# Patient Record
Sex: FEMALE | Race: OTHER | HISPANIC OR LATINO
[De-identification: names, ages, dates, MRNs, and addresses within clinical notes are randomized per-mention and may not be internally consistent; named-entity substitution may affect disease eponyms.]

---

## 2018-02-28 ENCOUNTER — ASOB RESULT (OUTPATIENT)
Age: 23
End: 2018-02-28

## 2018-02-28 ENCOUNTER — APPOINTMENT (OUTPATIENT)
Dept: ANTEPARTUM | Facility: CLINIC | Age: 23
End: 2018-02-28
Payer: MEDICAID

## 2018-02-28 PROBLEM — Z00.00 ENCOUNTER FOR PREVENTIVE HEALTH EXAMINATION: Status: ACTIVE | Noted: 2018-02-28

## 2018-02-28 PROCEDURE — 76816 OB US FOLLOW-UP PER FETUS: CPT

## 2018-03-28 ENCOUNTER — APPOINTMENT (OUTPATIENT)
Dept: ANTEPARTUM | Facility: CLINIC | Age: 23
End: 2018-03-28

## 2018-04-04 ENCOUNTER — INPATIENT (INPATIENT)
Facility: HOSPITAL | Age: 23
LOS: 1 days | Discharge: HOME | DRG: 779 | End: 2018-04-06
Attending: OBSTETRICS & GYNECOLOGY | Admitting: OBSTETRICS & GYNECOLOGY
Payer: MEDICAID

## 2018-04-04 ENCOUNTER — APPOINTMENT (OUTPATIENT)
Dept: ANTEPARTUM | Facility: CLINIC | Age: 23
End: 2018-04-04
Payer: COMMERCIAL

## 2018-04-04 ENCOUNTER — ASOB RESULT (OUTPATIENT)
Age: 23
End: 2018-04-04

## 2018-04-04 VITALS — HEIGHT: 67 IN | WEIGHT: 141.1 LBS

## 2018-04-04 DIAGNOSIS — O26.893 OTHER SPECIFIED PREGNANCY RELATED CONDITIONS, THIRD TRIMESTER: ICD-10-CM

## 2018-04-04 LAB
ABO RH CONFIRMATION: SIGNIFICANT CHANGE UP
ALBUMIN SERPL ELPH-MCNC: 3.5 G/DL — SIGNIFICANT CHANGE UP (ref 3.3–5.2)
ALP SERPL-CCNC: 59 U/L — SIGNIFICANT CHANGE UP (ref 40–120)
ALT FLD-CCNC: 13 U/L — SIGNIFICANT CHANGE UP
AMPHET UR-MCNC: NEGATIVE — SIGNIFICANT CHANGE UP
ANION GAP SERPL CALC-SCNC: 13 MMOL/L — SIGNIFICANT CHANGE UP (ref 5–17)
APPEARANCE UR: CLEAR — SIGNIFICANT CHANGE UP
APTT BLD: 30.2 SEC — SIGNIFICANT CHANGE UP (ref 27.5–37.4)
AST SERPL-CCNC: 20 U/L — SIGNIFICANT CHANGE UP
BARBITURATES UR SCN-MCNC: NEGATIVE — SIGNIFICANT CHANGE UP
BASOPHILS # BLD AUTO: 0 K/UL — SIGNIFICANT CHANGE UP (ref 0–0.2)
BASOPHILS NFR BLD AUTO: 0.5 % — SIGNIFICANT CHANGE UP (ref 0–2)
BENZODIAZ UR-MCNC: NEGATIVE — SIGNIFICANT CHANGE UP
BILIRUB SERPL-MCNC: 0.2 MG/DL — LOW (ref 0.4–2)
BILIRUB UR-MCNC: NEGATIVE — SIGNIFICANT CHANGE UP
BLD GP AB SCN SERPL QL: SIGNIFICANT CHANGE UP
BUN SERPL-MCNC: 9 MG/DL — SIGNIFICANT CHANGE UP (ref 8–20)
CALCIUM SERPL-MCNC: 8.8 MG/DL — SIGNIFICANT CHANGE UP (ref 8.6–10.2)
CHLORIDE SERPL-SCNC: 106 MMOL/L — SIGNIFICANT CHANGE UP (ref 98–107)
CO2 SERPL-SCNC: 21 MMOL/L — LOW (ref 22–29)
COCAINE METAB.OTHER UR-MCNC: NEGATIVE — SIGNIFICANT CHANGE UP
COLOR SPEC: YELLOW — SIGNIFICANT CHANGE UP
CREAT SERPL-MCNC: 0.58 MG/DL — SIGNIFICANT CHANGE UP (ref 0.5–1.3)
DIFF PNL FLD: NEGATIVE — SIGNIFICANT CHANGE UP
EOSINOPHIL # BLD AUTO: 0.2 K/UL — SIGNIFICANT CHANGE UP (ref 0–0.5)
EOSINOPHIL NFR BLD AUTO: 4.1 % — SIGNIFICANT CHANGE UP (ref 0–6)
GLUCOSE SERPL-MCNC: 83 MG/DL — SIGNIFICANT CHANGE UP (ref 70–115)
GLUCOSE UR QL: NEGATIVE MG/DL — SIGNIFICANT CHANGE UP
HCT VFR BLD CALC: 38.3 % — SIGNIFICANT CHANGE UP (ref 37–47)
HGB BLD-MCNC: 12.6 G/DL — SIGNIFICANT CHANGE UP (ref 12–16)
INR BLD: 0.88 RATIO — SIGNIFICANT CHANGE UP (ref 0.88–1.16)
KETONES UR-MCNC: NEGATIVE — SIGNIFICANT CHANGE UP
LEUKOCYTE ESTERASE UR-ACNC: NEGATIVE — SIGNIFICANT CHANGE UP
LYMPHOCYTES # BLD AUTO: 1.6 K/UL — SIGNIFICANT CHANGE UP (ref 1–4.8)
LYMPHOCYTES # BLD AUTO: 26.9 % — SIGNIFICANT CHANGE UP (ref 20–55)
MCHC RBC-ENTMCNC: 30.4 PG — SIGNIFICANT CHANGE UP (ref 27–31)
MCHC RBC-ENTMCNC: 32.9 G/DL — SIGNIFICANT CHANGE UP (ref 32–36)
MCV RBC AUTO: 92.3 FL — SIGNIFICANT CHANGE UP (ref 81–99)
METHADONE UR-MCNC: NEGATIVE — SIGNIFICANT CHANGE UP
MONOCYTES # BLD AUTO: 0.6 K/UL — SIGNIFICANT CHANGE UP (ref 0–0.8)
MONOCYTES NFR BLD AUTO: 9.3 % — SIGNIFICANT CHANGE UP (ref 3–10)
NEUTROPHILS # BLD AUTO: 3.5 K/UL — SIGNIFICANT CHANGE UP (ref 1.8–8)
NEUTROPHILS NFR BLD AUTO: 58.9 % — SIGNIFICANT CHANGE UP (ref 37–73)
NITRITE UR-MCNC: NEGATIVE — SIGNIFICANT CHANGE UP
OPIATES UR-MCNC: NEGATIVE — SIGNIFICANT CHANGE UP
PCP SPEC-MCNC: SIGNIFICANT CHANGE UP
PCP UR-MCNC: NEGATIVE — SIGNIFICANT CHANGE UP
PH UR: 8 — SIGNIFICANT CHANGE UP (ref 5–8)
PLATELET # BLD AUTO: 271 K/UL — SIGNIFICANT CHANGE UP (ref 150–400)
POTASSIUM SERPL-MCNC: 3.9 MMOL/L — SIGNIFICANT CHANGE UP (ref 3.5–5.3)
POTASSIUM SERPL-SCNC: 3.9 MMOL/L — SIGNIFICANT CHANGE UP (ref 3.5–5.3)
PROT SERPL-MCNC: 6.5 G/DL — LOW (ref 6.6–8.7)
PROT UR-MCNC: NEGATIVE MG/DL — SIGNIFICANT CHANGE UP
PROTHROM AB SERPL-ACNC: 9.7 SEC — LOW (ref 9.8–12.7)
RBC # BLD: 4.15 M/UL — LOW (ref 4.4–5.2)
RBC # FLD: 13.2 % — SIGNIFICANT CHANGE UP (ref 11–15.6)
SODIUM SERPL-SCNC: 140 MMOL/L — SIGNIFICANT CHANGE UP (ref 135–145)
SP GR SPEC: 1.01 — SIGNIFICANT CHANGE UP (ref 1.01–1.02)
THC UR QL: NEGATIVE — SIGNIFICANT CHANGE UP
TSH SERPL-MCNC: 1.27 UIU/ML — SIGNIFICANT CHANGE UP (ref 0.27–4.2)
TYPE + AB SCN PNL BLD: SIGNIFICANT CHANGE UP
UROBILINOGEN FLD QL: NEGATIVE MG/DL — SIGNIFICANT CHANGE UP
WBC # BLD: 5.9 K/UL — SIGNIFICANT CHANGE UP (ref 4.8–10.8)
WBC # FLD AUTO: 5.9 K/UL — SIGNIFICANT CHANGE UP (ref 4.8–10.8)

## 2018-04-04 PROCEDURE — 76817 TRANSVAGINAL US OBSTETRIC: CPT

## 2018-04-04 PROCEDURE — 76811 OB US DETAILED SNGL FETUS: CPT

## 2018-04-04 RX ORDER — SODIUM CHLORIDE 9 MG/ML
1000 INJECTION, SOLUTION INTRAVENOUS
Qty: 0 | Refills: 0 | Status: DISCONTINUED | OUTPATIENT
Start: 2018-04-04 | End: 2018-04-06

## 2018-04-04 RX ORDER — CITRIC ACID/SODIUM CITRATE 300-500 MG
30 SOLUTION, ORAL ORAL ONCE
Qty: 0 | Refills: 0 | Status: DISCONTINUED | OUTPATIENT
Start: 2018-04-04 | End: 2018-04-06

## 2018-04-04 RX ORDER — OXYTOCIN 10 UNIT/ML
333 VIAL (ML) INJECTION
Qty: 20 | Refills: 0 | Status: COMPLETED | OUTPATIENT
Start: 2018-04-04

## 2018-04-04 RX ORDER — CITRIC ACID/SODIUM CITRATE 300-500 MG
15 SOLUTION, ORAL ORAL EVERY 4 HOURS
Qty: 0 | Refills: 0 | Status: DISCONTINUED | OUTPATIENT
Start: 2018-04-04 | End: 2018-04-04

## 2018-04-04 RX ORDER — SODIUM CHLORIDE 9 MG/ML
1000 INJECTION, SOLUTION INTRAVENOUS ONCE
Qty: 0 | Refills: 0 | Status: DISCONTINUED | OUTPATIENT
Start: 2018-04-04 | End: 2018-04-06

## 2018-04-04 RX ADMIN — SODIUM CHLORIDE 125 MILLILITER(S): 9 INJECTION, SOLUTION INTRAVENOUS at 18:15

## 2018-04-05 ENCOUNTER — RESULT REVIEW (OUTPATIENT)
Age: 23
End: 2018-04-05

## 2018-04-05 LAB
KLEIHAUER-BETKE CALCULATION: 0 % — SIGNIFICANT CHANGE UP (ref 0–0.3)
T PALLIDUM AB TITR SER: NEGATIVE — SIGNIFICANT CHANGE UP

## 2018-04-05 PROCEDURE — 88307 TISSUE EXAM BY PATHOLOGIST: CPT | Mod: 26

## 2018-04-05 PROCEDURE — 88313 SPECIAL STAINS GROUP 2: CPT | Mod: 26

## 2018-04-05 RX ORDER — BUTORPHANOL TARTRATE 2 MG/ML
1 INJECTION, SOLUTION INTRAMUSCULAR; INTRAVENOUS ONCE
Qty: 0 | Refills: 0 | Status: DISCONTINUED | OUTPATIENT
Start: 2018-04-05 | End: 2018-04-05

## 2018-04-05 RX ORDER — OXYTOCIN 10 UNIT/ML
4 VIAL (ML) INJECTION
Qty: 30 | Refills: 0 | Status: DISCONTINUED | OUTPATIENT
Start: 2018-04-05 | End: 2018-04-06

## 2018-04-05 RX ORDER — AMPICILLIN TRIHYDRATE 250 MG
2 CAPSULE ORAL EVERY 6 HOURS
Qty: 0 | Refills: 0 | Status: COMPLETED | OUTPATIENT
Start: 2018-04-06 | End: 2018-04-06

## 2018-04-05 RX ORDER — BUTORPHANOL TARTRATE 2 MG/ML
2 INJECTION, SOLUTION INTRAMUSCULAR; INTRAVENOUS ONCE
Qty: 0 | Refills: 0 | Status: DISCONTINUED | OUTPATIENT
Start: 2018-04-05 | End: 2018-04-05

## 2018-04-05 RX ORDER — GENTAMICIN SULFATE 40 MG/ML
VIAL (ML) INJECTION
Qty: 0 | Refills: 0 | Status: DISCONTINUED | OUTPATIENT
Start: 2018-04-05 | End: 2018-04-06

## 2018-04-05 RX ORDER — GENTAMICIN SULFATE 40 MG/ML
100 VIAL (ML) INJECTION ONCE
Qty: 0 | Refills: 0 | Status: COMPLETED | OUTPATIENT
Start: 2018-04-05 | End: 2018-04-05

## 2018-04-05 RX ORDER — OXYTOCIN 10 UNIT/ML
333 VIAL (ML) INJECTION
Qty: 20 | Refills: 0 | Status: DISCONTINUED | OUTPATIENT
Start: 2018-04-05 | End: 2018-04-06

## 2018-04-05 RX ORDER — BUTORPHANOL TARTRATE 2 MG/ML
2 INJECTION, SOLUTION INTRAMUSCULAR; INTRAVENOUS ONCE
Qty: 0 | Refills: 0 | Status: DISCONTINUED | OUTPATIENT
Start: 2018-04-05 | End: 2018-04-06

## 2018-04-05 RX ORDER — AMPICILLIN TRIHYDRATE 250 MG
2 CAPSULE ORAL ONCE
Qty: 0 | Refills: 0 | Status: COMPLETED | OUTPATIENT
Start: 2018-04-05 | End: 2018-04-05

## 2018-04-05 RX ORDER — GENTAMICIN SULFATE 40 MG/ML
60 VIAL (ML) INJECTION EVERY 8 HOURS
Qty: 0 | Refills: 0 | Status: DISCONTINUED | OUTPATIENT
Start: 2018-04-06 | End: 2018-04-06

## 2018-04-05 RX ORDER — AMPICILLIN TRIHYDRATE 250 MG
2 CAPSULE ORAL ONCE
Qty: 0 | Refills: 0 | Status: DISCONTINUED | OUTPATIENT
Start: 2018-04-05 | End: 2018-04-05

## 2018-04-05 RX ORDER — AMPICILLIN TRIHYDRATE 250 MG
CAPSULE ORAL
Qty: 0 | Refills: 0 | Status: COMPLETED | OUTPATIENT
Start: 2018-04-06 | End: 2018-04-06

## 2018-04-05 RX ORDER — AMPICILLIN TRIHYDRATE 250 MG
CAPSULE ORAL
Qty: 0 | Refills: 0 | Status: DISCONTINUED | OUTPATIENT
Start: 2018-04-05 | End: 2018-04-05

## 2018-04-05 RX ORDER — ONDANSETRON 8 MG/1
4 TABLET, FILM COATED ORAL EVERY 4 HOURS
Qty: 0 | Refills: 0 | Status: DISCONTINUED | OUTPATIENT
Start: 2018-04-05 | End: 2018-04-06

## 2018-04-05 RX ORDER — ONDANSETRON 8 MG/1
4 TABLET, FILM COATED ORAL EVERY 4 HOURS
Qty: 0 | Refills: 0 | Status: DISCONTINUED | OUTPATIENT
Start: 2018-04-05 | End: 2018-04-05

## 2018-04-05 RX ADMIN — BUTORPHANOL TARTRATE 2 MILLIGRAM(S): 2 INJECTION, SOLUTION INTRAMUSCULAR; INTRAVENOUS at 04:53

## 2018-04-05 RX ADMIN — BUTORPHANOL TARTRATE 1 MILLIGRAM(S): 2 INJECTION, SOLUTION INTRAMUSCULAR; INTRAVENOUS at 13:32

## 2018-04-05 RX ADMIN — ONDANSETRON 4 MILLIGRAM(S): 8 TABLET, FILM COATED ORAL at 08:28

## 2018-04-05 RX ADMIN — Medication 4 MILLIUNIT(S)/MIN: at 18:12

## 2018-04-05 RX ADMIN — BUTORPHANOL TARTRATE 1 MILLIGRAM(S): 2 INJECTION, SOLUTION INTRAMUSCULAR; INTRAVENOUS at 13:47

## 2018-04-05 RX ADMIN — BUTORPHANOL TARTRATE 2 MILLIGRAM(S): 2 INJECTION, SOLUTION INTRAMUSCULAR; INTRAVENOUS at 00:45

## 2018-04-05 RX ADMIN — Medication 216 GRAM(S): at 20:08

## 2018-04-05 RX ADMIN — BUTORPHANOL TARTRATE 2 MILLIGRAM(S): 2 INJECTION, SOLUTION INTRAMUSCULAR; INTRAVENOUS at 01:00

## 2018-04-05 RX ADMIN — Medication 999 MILLIUNIT(S)/MIN: at 18:29

## 2018-04-05 RX ADMIN — BUTORPHANOL TARTRATE 2 MILLIGRAM(S): 2 INJECTION, SOLUTION INTRAMUSCULAR; INTRAVENOUS at 05:08

## 2018-04-05 RX ADMIN — Medication 200 MILLIGRAM(S): at 20:48

## 2018-04-05 RX ADMIN — BUTORPHANOL TARTRATE 1 MILLIGRAM(S): 2 INJECTION, SOLUTION INTRAMUSCULAR; INTRAVENOUS at 09:17

## 2018-04-05 RX ADMIN — BUTORPHANOL TARTRATE 1 MILLIGRAM(S): 2 INJECTION, SOLUTION INTRAMUSCULAR; INTRAVENOUS at 09:02

## 2018-04-06 ENCOUNTER — TRANSCRIPTION ENCOUNTER (OUTPATIENT)
Age: 23
End: 2018-04-06

## 2018-04-06 VITALS
OXYGEN SATURATION: 98 % | SYSTOLIC BLOOD PRESSURE: 95 MMHG | HEART RATE: 72 BPM | DIASTOLIC BLOOD PRESSURE: 53 MMHG | TEMPERATURE: 98 F | RESPIRATION RATE: 18 BRPM

## 2018-04-06 DIAGNOSIS — O36.4XX0 MATERNAL CARE FOR INTRAUTERINE DEATH, NOT APPLICABLE OR UNSPECIFIED: ICD-10-CM

## 2018-04-06 LAB
GRAM STN FLD: SIGNIFICANT CHANGE UP
GRAM STN FLD: SIGNIFICANT CHANGE UP
SPECIMEN SOURCE: SIGNIFICANT CHANGE UP
SPECIMEN SOURCE: SIGNIFICANT CHANGE UP

## 2018-04-06 RX ORDER — IBUPROFEN 200 MG
1 TABLET ORAL
Qty: 56 | Refills: 0
Start: 2018-04-06 | End: 2018-04-19

## 2018-04-06 RX ADMIN — Medication 216 GRAM(S): at 03:05

## 2018-04-06 RX ADMIN — Medication 200 MILLIGRAM(S): at 05:22

## 2018-04-06 RX ADMIN — Medication 216 GRAM(S): at 10:13

## 2018-04-06 RX ADMIN — Medication 200 MILLIGRAM(S): at 15:05

## 2018-04-06 NOTE — DISCHARGE NOTE ADULT - PROVIDER TOKENS
FREE:[LAST:[HR],PHONE:[(   )    -],FAX:[(   )    -],ADDRESS:[[facility], [place] [address]  Ph: [phone]]]

## 2018-04-06 NOTE — DISCHARGE NOTE ADULT - CARE PLAN
Principal Discharge DX:	Fetal demise > 22 weeks, delivered, current hospitalization  Goal:	Recovery  Assessment and plan of treatment:	Please take ibuprofen as needed for pain. Follow up with PMD at [facility] clinic in 4-6 weeks for check up.

## 2018-04-06 NOTE — PROGRESS NOTE ADULT - ATTENDING COMMENTS
s/p induced delivery of fetal demise at 23 wks, ebl 100cc  no complaints  denies pain or chills  exam wnl    had low grade temp prior to delivery without foul smelling fluid, uterine tenderness or tachycardia  likely pyrexia due to large doses of prostaglandins  received prophylactic antibiotics for 24 hrs    declined pastoral care or social work  signed consents for autopsy and cytogenetic analysis  discharge home today  follow up in 1 wk in [place]  precautions understood

## 2018-04-06 NOTE — DISCHARGE NOTE ADULT - OTHER SIGNIFICANT FINDINGS
12.6   5.9   )-----------( 271      ( 04 Apr 2018 18:39 )             38.3       Culture - Surgical Swab (collected 06 Apr 2018 08:30)  Source: Placenta placenta maternal side  Gram Stain (06 Apr 2018 08:44):    No gram stain performed, only one swab received for culture.    Culture - Surgical Swab (collected 06 Apr 2018 08:28)  Source: Placenta placenta fetal side  Gram Stain (06 Apr 2018 08:44):    No gram stain performed, only one swab received for culture.

## 2018-04-06 NOTE — DISCHARGE NOTE ADULT - PLAN OF CARE
Recovery Please take ibuprofen as needed for pain. Follow up with PMD at [facility] clinic in 4-6 weeks for check up.

## 2018-04-06 NOTE — DISCHARGE NOTE ADULT - MEDICATION SUMMARY - MEDICATIONS TO TAKE
I will START or STAY ON the medications listed below when I get home from the hospital:    ibuprofen 600 mg oral tablet  -- 1 tab(s) by mouth every 6 hours as needed for pain  -- Do not take this drug if you are pregnant.  It is very important that you take or use this exactly as directed.  Do not skip doses or discontinue unless directed by your doctor.  May cause drowsiness or dizziness.  Obtain medical advice before taking any non-prescription drugs as some may affect the action of this medication.  Take with food or milk.    -- Indication: For pain

## 2018-04-06 NOTE — DISCHARGE NOTE ADULT - HOSPITAL COURSE
Pt is 23y year old   s/p fetal demise at 23 wks gestation. Fetus and placenta sent to lab for further analysis. Pt received antibiotics and was afebrile 24 hour. Her recovery course was uncomplicated. Pt advised to take pain meds as needed. Pt is now medically optimized for discharge to follow up with PMD in 4-6 weeks.

## 2018-04-06 NOTE — DISCHARGE NOTE ADULT - CARE PROVIDER_API CALL
[facility],   [address], [place] [address]  Ph: [phone]  Phone: (   )    -  Fax: (   )    -

## 2018-04-06 NOTE — PROGRESS NOTE ADULT - PROBLEM SELECTOR PLAN 1
Patient delivered  Patient currently on antibiotics- will d/c antibiotics if patient remains afebrile for 24 hours.   If patient remains a febrile patient can be d/c later this afternoon  - Fetus and placenta have been sent for Cytogenetic testing as well autopsy.

## 2018-04-06 NOTE — DISCHARGE NOTE ADULT - PATIENT PORTAL LINK FT
You can access the [facility] Patient Portal, offered by [facility], by registering with the following website: [email or web address]

## 2018-04-06 NOTE — PROGRESS NOTE ADULT - SUBJECTIVE AND OBJECTIVE BOX
23y year old   s/p fetal demise at 23 wks gestation PPD#1.   Patient seen and examined at bedside, no acute overnight events.   Patient is ambulating, +eating, +PO hydration, +voiding, +Flatus, -BM, +Formula feeding, +Breast feeding and pain is well controlled.  Denies headache, SOB, fever, chills and calf pain.    VS:   Vital Signs Last 24 Hrs  T(C): 36.8 (2018 05:43), Max: 37.1 (2018 01:35)  T(F): 98.2 (2018 05:43), Max: 98.8 (2018 01:35)  HR: 69 (2018 05:43) (68 - 79)  BP: 95/52 (2018 05:43) (95/52 - 126/63)  BP(mean): --  RR: 16 (2018 05:43) (16 - 18)  SpO2: 98% (2018 05:43) (97% - 99%)    Physical Exam:  General: NAD  CVS: RRR, +S1/S2  Lungs: CTAB, no wheeze, ronchi or rales.   Breast: No tenderness or abnormal discharge.  Abdomen: +BS, soft, ND, minimally tender, Fundus firm at level of umbilicus.   Pelvic: Minimal lochia  Ext: No cyanosis, edema or calf tenderness.     Labs:                        12.6   5.9   )-----------( 271      ( 2018 18:39 )             38.3     Urinalysis Basic - ( 2018 18:37 )    Color: Yellow / Appearance: Clear / S.010 / pH: x  Gluc: x / Ketone: Negative  / Bili: Negative / Urobili: Negative mg/dL   Blood: x / Protein: Negative mg/dL / Nitrite: Negative   Leuk Esterase: Negative / RBC: x / WBC x   Sq Epi: x / Non Sq Epi: x / Bacteria: x        Medication:  MEDICATIONS  (STANDING):  ampicillin  IVPB      ampicillin  IVPB 2 Gram(s) IV Intermittent every 6 hours  butorphanol Injectable 2 milliGRAM(s) IV Push once  citric acid/sodium citrate Solution 30 milliLiter(s) Oral once  gentamicin   IVPB 60 milliGRAM(s) IV Intermittent every 8 hours  gentamicin   IVPB      lactated ringers Bolus 1000 milliLiter(s) IV Bolus once  oxytocin Infusion 4 milliUNIT(s)/Min (4 mL/Hr) IV Continuous <Continuous>  oxytocin Infusion 333 milliUNIT(s)/Min (999 mL/Hr) IV Continuous <Continuous>    MEDICATIONS  (PRN):  ondansetron Injectable 4 milliGRAM(s) IV Push every 4 hours PRN Nausea and/or Vomiting

## 2018-04-08 LAB
-  AMIKACIN: SIGNIFICANT CHANGE UP
-  AMPICILLIN/SULBACTAM: SIGNIFICANT CHANGE UP
-  AMPICILLIN: SIGNIFICANT CHANGE UP
-  AZTREONAM: SIGNIFICANT CHANGE UP
-  CEFAZOLIN: SIGNIFICANT CHANGE UP
-  CEFEPIME: SIGNIFICANT CHANGE UP
-  CEFOXITIN: SIGNIFICANT CHANGE UP
-  CEFTRIAXONE: SIGNIFICANT CHANGE UP
-  CIPROFLOXACIN: SIGNIFICANT CHANGE UP
-  CLINDAMYCIN: SIGNIFICANT CHANGE UP
-  ERTAPENEM: SIGNIFICANT CHANGE UP
-  ERYTHROMYCIN: SIGNIFICANT CHANGE UP
-  GENTAMICIN: SIGNIFICANT CHANGE UP
-  IMIPENEM: SIGNIFICANT CHANGE UP
-  LEVOFLOXACIN: SIGNIFICANT CHANGE UP
-  MEROPENEM: SIGNIFICANT CHANGE UP
-  MOXIFLOXACIN(AEROBIC): SIGNIFICANT CHANGE UP
-  MOXIFLOXACIN(AEROBIC): SIGNIFICANT CHANGE UP
-  OXACILLIN: SIGNIFICANT CHANGE UP
-  OXACILLIN: SIGNIFICANT CHANGE UP
-  PENICILLIN: SIGNIFICANT CHANGE UP
-  PIPERACILLIN/TAZOBACTAM: SIGNIFICANT CHANGE UP
-  RIFAMPIN: SIGNIFICANT CHANGE UP
-  RIFAMPIN: SIGNIFICANT CHANGE UP
-  TETRACYCLINE: SIGNIFICANT CHANGE UP
-  TETRACYCLINE: SIGNIFICANT CHANGE UP
-  TOBRAMYCIN: SIGNIFICANT CHANGE UP
-  TRIMETHOPRIM/SULFAMETHOXAZOLE: SIGNIFICANT CHANGE UP
-  VANCOMYCIN: SIGNIFICANT CHANGE UP
METHOD TYPE: SIGNIFICANT CHANGE UP

## 2018-04-11 LAB
CULTURE RESULTS: SIGNIFICANT CHANGE UP
CULTURE RESULTS: SIGNIFICANT CHANGE UP
ORGANISM # SPEC MICROSCOPIC CNT: SIGNIFICANT CHANGE UP
SPECIMEN SOURCE: SIGNIFICANT CHANGE UP
SPECIMEN SOURCE: SIGNIFICANT CHANGE UP

## 2018-05-23 ENCOUNTER — EMERGENCY (EMERGENCY)
Facility: HOSPITAL | Age: 23
LOS: 1 days | Discharge: HOME | End: 2018-05-23
Attending: EMERGENCY MEDICINE
Payer: COMMERCIAL

## 2018-05-23 VITALS
HEART RATE: 83 BPM | DIASTOLIC BLOOD PRESSURE: 65 MMHG | SYSTOLIC BLOOD PRESSURE: 117 MMHG | TEMPERATURE: 98 F | RESPIRATION RATE: 18 BRPM | OXYGEN SATURATION: 100 %

## 2018-05-23 VITALS — WEIGHT: 138.01 LBS | HEIGHT: 67 IN

## 2018-05-23 DIAGNOSIS — N93.9 ABNORMAL UTERINE AND VAGINAL BLEEDING, UNSPECIFIED: ICD-10-CM

## 2018-05-23 DIAGNOSIS — Z79.1 LONG TERM (CURRENT) USE OF NON-STEROIDAL ANTI-INFLAMMATORIES (NSAID): ICD-10-CM

## 2018-05-23 LAB
ALBUMIN SERPL ELPH-MCNC: 4.3 G/DL — SIGNIFICANT CHANGE UP (ref 3.3–5.2)
ALP SERPL-CCNC: 53 U/L — SIGNIFICANT CHANGE UP (ref 40–120)
ALT FLD-CCNC: 15 U/L — SIGNIFICANT CHANGE UP
ANION GAP SERPL CALC-SCNC: 13 MMOL/L — SIGNIFICANT CHANGE UP (ref 5–17)
APPEARANCE UR: CLEAR — SIGNIFICANT CHANGE UP
AST SERPL-CCNC: 21 U/L — SIGNIFICANT CHANGE UP
BASOPHILS # BLD AUTO: 0 K/UL — SIGNIFICANT CHANGE UP (ref 0–0.2)
BASOPHILS NFR BLD AUTO: 0.7 % — SIGNIFICANT CHANGE UP (ref 0–2)
BILIRUB SERPL-MCNC: 0.3 MG/DL — LOW (ref 0.4–2)
BILIRUB UR-MCNC: NEGATIVE — SIGNIFICANT CHANGE UP
BUN SERPL-MCNC: 10 MG/DL — SIGNIFICANT CHANGE UP (ref 8–20)
CALCIUM SERPL-MCNC: 9.2 MG/DL — SIGNIFICANT CHANGE UP (ref 8.6–10.2)
CHLORIDE SERPL-SCNC: 101 MMOL/L — SIGNIFICANT CHANGE UP (ref 98–107)
CO2 SERPL-SCNC: 25 MMOL/L — SIGNIFICANT CHANGE UP (ref 22–29)
COLOR SPEC: YELLOW — SIGNIFICANT CHANGE UP
CREAT SERPL-MCNC: 0.78 MG/DL — SIGNIFICANT CHANGE UP (ref 0.5–1.3)
DIFF PNL FLD: ABNORMAL
DIR ANTIGLOB POLYSPECIFIC INTERPRETATION: SIGNIFICANT CHANGE UP
EOSINOPHIL # BLD AUTO: 0.4 K/UL — SIGNIFICANT CHANGE UP (ref 0–0.5)
EOSINOPHIL NFR BLD AUTO: 6.3 % — HIGH (ref 0–6)
GLUCOSE SERPL-MCNC: 86 MG/DL — SIGNIFICANT CHANGE UP (ref 70–115)
GLUCOSE UR QL: NEGATIVE MG/DL — SIGNIFICANT CHANGE UP
HCG SERPL-ACNC: <5 MIU/ML — SIGNIFICANT CHANGE UP
HCG UR QL: NEGATIVE — SIGNIFICANT CHANGE UP
HCT VFR BLD CALC: 40.3 % — SIGNIFICANT CHANGE UP (ref 37–47)
HGB BLD-MCNC: 13.2 G/DL — SIGNIFICANT CHANGE UP (ref 12–16)
KETONES UR-MCNC: NEGATIVE — SIGNIFICANT CHANGE UP
LEUKOCYTE ESTERASE UR-ACNC: NEGATIVE — SIGNIFICANT CHANGE UP
LYMPHOCYTES # BLD AUTO: 2.4 K/UL — SIGNIFICANT CHANGE UP (ref 1–4.8)
LYMPHOCYTES # BLD AUTO: 41.3 % — SIGNIFICANT CHANGE UP (ref 20–55)
MCHC RBC-ENTMCNC: 29.3 PG — SIGNIFICANT CHANGE UP (ref 27–31)
MCHC RBC-ENTMCNC: 32.8 G/DL — SIGNIFICANT CHANGE UP (ref 32–36)
MCV RBC AUTO: 89.4 FL — SIGNIFICANT CHANGE UP (ref 81–99)
MONOCYTES # BLD AUTO: 0.6 K/UL — SIGNIFICANT CHANGE UP (ref 0–0.8)
MONOCYTES NFR BLD AUTO: 10.7 % — HIGH (ref 3–10)
NEUTROPHILS # BLD AUTO: 2.4 K/UL — SIGNIFICANT CHANGE UP (ref 1.8–8)
NEUTROPHILS NFR BLD AUTO: 41 % — SIGNIFICANT CHANGE UP (ref 37–73)
NITRITE UR-MCNC: NEGATIVE — SIGNIFICANT CHANGE UP
PH UR: 6 — SIGNIFICANT CHANGE UP (ref 5–8)
PLATELET # BLD AUTO: 304 K/UL — SIGNIFICANT CHANGE UP (ref 150–400)
POTASSIUM SERPL-MCNC: 3.6 MMOL/L — SIGNIFICANT CHANGE UP (ref 3.5–5.3)
POTASSIUM SERPL-SCNC: 3.6 MMOL/L — SIGNIFICANT CHANGE UP (ref 3.5–5.3)
PROT SERPL-MCNC: 7.6 G/DL — SIGNIFICANT CHANGE UP (ref 6.6–8.7)
PROT UR-MCNC: 15 MG/DL
RBC # BLD: 4.51 M/UL — SIGNIFICANT CHANGE UP (ref 4.4–5.2)
RBC # FLD: 11.5 % — SIGNIFICANT CHANGE UP (ref 11–15.6)
SODIUM SERPL-SCNC: 139 MMOL/L — SIGNIFICANT CHANGE UP (ref 135–145)
SP GR SPEC: 1.01 — SIGNIFICANT CHANGE UP (ref 1.01–1.02)
TYPE + AB SCN PNL BLD: SIGNIFICANT CHANGE UP
UROBILINOGEN FLD QL: NEGATIVE MG/DL — SIGNIFICANT CHANGE UP
WBC # BLD: 5.9 K/UL — SIGNIFICANT CHANGE UP (ref 4.8–10.8)
WBC # FLD AUTO: 5.9 K/UL — SIGNIFICANT CHANGE UP (ref 4.8–10.8)

## 2018-05-23 PROCEDURE — 85610 PROTHROMBIN TIME: CPT

## 2018-05-23 PROCEDURE — 87070 CULTURE OTHR SPECIMN AEROBIC: CPT

## 2018-05-23 PROCEDURE — 85730 THROMBOPLASTIN TIME PARTIAL: CPT

## 2018-05-23 PROCEDURE — 87075 CULTR BACTERIA EXCEPT BLOOD: CPT

## 2018-05-23 PROCEDURE — 85027 COMPLETE CBC AUTOMATED: CPT

## 2018-05-23 PROCEDURE — 99284 EMERGENCY DEPT VISIT MOD MDM: CPT

## 2018-05-23 PROCEDURE — 81003 URINALYSIS AUTO W/O SCOPE: CPT

## 2018-05-23 PROCEDURE — 86780 TREPONEMA PALLIDUM: CPT

## 2018-05-23 PROCEDURE — 76815 OB US LIMITED FETUS(S): CPT

## 2018-05-23 PROCEDURE — 85460 HEMOGLOBIN FETAL: CPT

## 2018-05-23 PROCEDURE — 88313 SPECIAL STAINS GROUP 2: CPT

## 2018-05-23 PROCEDURE — G0463: CPT

## 2018-05-23 PROCEDURE — 59025 FETAL NON-STRESS TEST: CPT

## 2018-05-23 PROCEDURE — 80307 DRUG TEST PRSMV CHEM ANLYZR: CPT

## 2018-05-23 PROCEDURE — 80053 COMPREHEN METABOLIC PANEL: CPT

## 2018-05-23 PROCEDURE — 84443 ASSAY THYROID STIM HORMONE: CPT

## 2018-05-23 PROCEDURE — 86850 RBC ANTIBODY SCREEN: CPT

## 2018-05-23 PROCEDURE — 86901 BLOOD TYPING SEROLOGIC RH(D): CPT

## 2018-05-23 PROCEDURE — T1013: CPT

## 2018-05-23 PROCEDURE — 88307 TISSUE EXAM BY PATHOLOGIST: CPT

## 2018-05-23 PROCEDURE — 36415 COLL VENOUS BLD VENIPUNCTURE: CPT

## 2018-05-23 PROCEDURE — 87186 SC STD MICRODIL/AGAR DIL: CPT

## 2018-05-23 PROCEDURE — 86900 BLOOD TYPING SEROLOGIC ABO: CPT

## 2018-05-23 PROCEDURE — 59050 FETAL MONITOR W/REPORT: CPT

## 2018-05-23 NOTE — ED ADULT NURSE NOTE - OBJECTIVE STATEMENT
pt presents to ED with vaginal bleeding since yesterday. pt stats she took a pregnancy 4 days ago and was positive. pt states she had a miscarriage 2 months ago. pt c/o abd pain yesterday. afebrile. a&ox3

## 2018-05-23 NOTE — ED PROVIDER NOTE - OBJECTIVE STATEMENT
This patient is a 23 year old woman  who presents to the ER c/o vaginal spotting.  She reports that she recently was pregnant and had fetal demise 2 months ago.  Her menses has not yet returned.  She began experiencing spotting yesterday.  She took a home pregnancy test and states that it was positive.  She denies abdominal pain at this time, dysuria, hematuria, and fever.

## 2018-05-24 LAB — ALLERGY+IMMUNOLOGY DIAG STUDY NOTE: SIGNIFICANT CHANGE UP

## 2018-05-24 PROCEDURE — 99283 EMERGENCY DEPT VISIT LOW MDM: CPT

## 2018-05-24 PROCEDURE — 85027 COMPLETE CBC AUTOMATED: CPT

## 2018-05-24 PROCEDURE — 86870 RBC ANTIBODY IDENTIFICATION: CPT

## 2018-05-24 PROCEDURE — 84702 CHORIONIC GONADOTROPIN TEST: CPT

## 2018-05-24 PROCEDURE — 86850 RBC ANTIBODY SCREEN: CPT

## 2018-05-24 PROCEDURE — 86901 BLOOD TYPING SEROLOGIC RH(D): CPT

## 2018-05-24 PROCEDURE — 86880 COOMBS TEST DIRECT: CPT

## 2018-05-24 PROCEDURE — 81025 URINE PREGNANCY TEST: CPT

## 2018-05-24 PROCEDURE — 81001 URINALYSIS AUTO W/SCOPE: CPT

## 2018-05-24 PROCEDURE — 36415 COLL VENOUS BLD VENIPUNCTURE: CPT

## 2018-05-24 PROCEDURE — T1013: CPT

## 2018-05-24 PROCEDURE — 80053 COMPREHEN METABOLIC PANEL: CPT

## 2018-05-24 PROCEDURE — 86900 BLOOD TYPING SEROLOGIC ABO: CPT

## 2019-09-23 ENCOUNTER — APPOINTMENT (OUTPATIENT)
Dept: ANTEPARTUM | Facility: CLINIC | Age: 24
End: 2019-09-23

## 2019-09-23 ENCOUNTER — APPOINTMENT (OUTPATIENT)
Dept: MATERNAL FETAL MEDICINE | Facility: CLINIC | Age: 24
End: 2019-09-23

## 2019-09-30 PROBLEM — Z87.42 HISTORY OF PCOS: Status: RESOLVED | Noted: 2019-09-30 | Resolved: 2019-09-30

## 2019-09-30 PROBLEM — O36.4XX0 FETAL DEMISE, GREATER THAN 22 WEEKS, ANTEPARTUM: Status: RESOLVED | Noted: 2019-09-30 | Resolved: 2019-09-30

## 2019-09-30 RX ORDER — MULTIVIT-MIN/FOLIC/VIT K/LYCOP 400-300MCG
28-0.8 TABLET ORAL DAILY
Refills: 0 | Status: ACTIVE | COMMUNITY
Start: 2019-09-30

## 2019-10-02 ENCOUNTER — ASOB RESULT (OUTPATIENT)
Age: 24
End: 2019-10-02

## 2019-10-02 ENCOUNTER — APPOINTMENT (OUTPATIENT)
Dept: MATERNAL FETAL MEDICINE | Facility: CLINIC | Age: 24
End: 2019-10-02
Payer: MEDICAID

## 2019-10-02 ENCOUNTER — APPOINTMENT (OUTPATIENT)
Dept: ANTEPARTUM | Facility: CLINIC | Age: 24
End: 2019-10-02
Payer: MEDICAID

## 2019-10-02 VITALS
SYSTOLIC BLOOD PRESSURE: 128 MMHG | HEIGHT: 67 IN | BODY MASS INDEX: 23.39 KG/M2 | WEIGHT: 149 LBS | DIASTOLIC BLOOD PRESSURE: 70 MMHG | HEART RATE: 80 BPM

## 2019-10-02 DIAGNOSIS — Z87.42 PERSONAL HISTORY OF OTHER DISEASES OF THE FEMALE GENITAL TRACT: ICD-10-CM

## 2019-10-02 DIAGNOSIS — O36.4XX0 MATERNAL CARE FOR INTRAUTERINE DEATH, NOT APPLICABLE OR UNSPECIFIED: ICD-10-CM

## 2019-10-02 DIAGNOSIS — Z87.59 PERSONAL HISTORY OF OTHER COMPLICATIONS OF PREGNANCY, CHILDBIRTH AND THE PUERPERIUM: ICD-10-CM

## 2019-10-02 DIAGNOSIS — O09.291 SUPERVISION OF PREGNANCY WITH OTHER POOR REPRODUCTIVE OR OBSTETRIC HISTORY, FIRST TRIMESTER: ICD-10-CM

## 2019-10-02 DIAGNOSIS — Z78.9 OTHER SPECIFIED HEALTH STATUS: ICD-10-CM

## 2019-10-02 DIAGNOSIS — Z83.3 FAMILY HISTORY OF DIABETES MELLITUS: ICD-10-CM

## 2019-10-02 DIAGNOSIS — Z3A.12 12 WEEKS GESTATION OF PREGNANCY: ICD-10-CM

## 2019-10-02 PROCEDURE — 76813 OB US NUCHAL MEAS 1 GEST: CPT

## 2019-10-02 PROCEDURE — 76801 OB US < 14 WKS SINGLE FETUS: CPT

## 2019-10-02 PROCEDURE — 99214 OFFICE O/P EST MOD 30 MIN: CPT | Mod: 25

## 2019-10-02 PROCEDURE — 36416 COLLJ CAPILLARY BLOOD SPEC: CPT

## 2019-10-02 NOTE — OB HISTORY
[Pregnancy History] : Vaginal delivery [___] : pregnancy complications occured [LMP: ___] : LMP: [unfilled] [KEVIN: ___] : KEVIN: [unfilled] [EGA: ___ wks] : EGA: [unfilled] wks [Spontaneous] : Spontaneous conception [Sonogram] : sonogram [at ___ wks] : at [unfilled] weeks [Definite:  ___ (Date)] : the last menstrual period was [unfilled] [Normal Amount/Duration] : was of a normal amount and duration [Regular Cycle Intervals] : periods have been regular [Menstrual Cramps] : menstrual cramps [Spotting Between  Menses] : no spotting between menses

## 2019-10-02 NOTE — FAMILY HISTORY
[Age 35+ During Pregnancy] : not 35 or over during pregnancy [Reported Family History Of Birth Defects] : no congenital heart defects [Avelino-Sachs Carrier] : no Avelino-Sachs [Family History] : no mental retardation/autism [Reported Family History Of Genetic Disease] : no history of child defect in child of baby father

## 2019-10-02 NOTE — ACTIVE PROBLEMS
[Diabetes Mellitus] : no diabetes mellitus [Hypertension] : no hypertension [Heart Disease] : no heart disease [Autoimmune Disease] : no autoimmune disease [Renal Disease] : no kidney disease, no UTI [Neurologic Disorder] : no neurologic disorder, no epilepsy [Hepatic Disorder] : no hepatitis, no liver disease [Psychiatric Disorders] : no psychiatric disorders [Depression] : no depression, no post partum depression [Thrombophlebitis] : no varicosities, no phlebitis [Trauma] : no trauma/violence [Thyroid Disorder] : no thyroid dysfunction [Blood Transfusion (___ Ml)] : no history of blood transfusion

## 2019-10-02 NOTE — DISCUSSION/SUMMARY
[FreeTextEntry1] : She is 12 weeks and 4 days gestation by her last menstrual period dates.\par \par She had a 23 week fetal demise attributed to a placental abruption. The abruptio placenta was confirmed by placental pathology. I told her that a history of a prior placental abruption increases the risk of having an abruptio placenta in subsequent pregnancies. She denied having chronic hypertension, smoking cigarettes, and using illicit drugs. I advised her to take folic acid supplementation. She was given a prescription to take folic acid 1 mg daily.

## 2019-10-02 NOTE — CHIEF COMPLAINT
[G ___] : G [unfilled] [P ___] : P [unfilled] [de-identified] : history of fetal demise at 23 weeks due to abruptio placenta

## 2019-10-02 NOTE — PAST MEDICAL HISTORY
[Exposure To Gonorrhea] : no gonorrhea [HIV Infection] : no HIV [Chlamydial Infections] : no chlamydia [Syphilis] : no syphilis [Herpes Simplex] : no genital herpes [Human Papilloma Virus Infection] : no genital warts [Hepatitis, B Virus] : no Hepatitis B [Hepatitis, C Virus] : no Hepatitis C [Trichomoniasis] : no trichomoniasis

## 2019-10-02 NOTE — VITALS
[LMP (date): ___] : LMP was on [unfilled] [GA =___ Weeks] : which calculates to a GA of [unfilled] weeks [GA= ___ Days] : and [unfilled] day(s) [KEVIN by LMP (date): ___] : The calculated KEVIN by LMP is [unfilled] [By LMP] : this is the final KEVIN

## 2019-10-02 NOTE — LETTER CLOSING
[Thank you very much for allowing me to participate in the care of this patient.] : Thank you very much for allowing me to participate in the care of this patient [If you have any questions, please do not hesitate to contact our office.] : If you have any questions, please do not hesitate to contact our office.

## 2019-10-21 LAB
1ST TRIMESTER DATA: NORMAL
ADDENDUM DOC: NORMAL
AFP PNL SERPL: NORMAL
AFP SERPL-ACNC: NORMAL
CLINICAL BIOCHEMIST REVIEW: NORMAL
FREE BETA HCG 1ST TRIMESTER: NORMAL
Lab: NORMAL
NASAL BONE: PRESENT
NOTES NTD: NORMAL
NT: NORMAL
PAPP-A SERPL-ACNC: NORMAL
TRISOMY 18/3: NORMAL

## 2019-11-11 ENCOUNTER — ASOB RESULT (OUTPATIENT)
Age: 24
End: 2019-11-11

## 2019-11-11 ENCOUNTER — APPOINTMENT (OUTPATIENT)
Dept: ANTEPARTUM | Facility: CLINIC | Age: 24
End: 2019-11-11
Payer: MEDICAID

## 2019-11-11 PROCEDURE — 76811 OB US DETAILED SNGL FETUS: CPT

## 2019-11-11 PROCEDURE — 76817 TRANSVAGINAL US OBSTETRIC: CPT

## 2019-11-26 ENCOUNTER — ASOB RESULT (OUTPATIENT)
Age: 24
End: 2019-11-26

## 2019-11-26 ENCOUNTER — APPOINTMENT (OUTPATIENT)
Dept: ANTEPARTUM | Facility: CLINIC | Age: 24
End: 2019-11-26
Payer: MEDICAID

## 2019-11-26 PROCEDURE — 76816 OB US FOLLOW-UP PER FETUS: CPT

## 2020-01-07 ENCOUNTER — RX RENEWAL (OUTPATIENT)
Age: 25
End: 2020-01-07

## 2020-01-07 RX ORDER — FOLIC ACID 1 MG/1
1 TABLET ORAL DAILY
Qty: 30 | Refills: 0 | Status: ACTIVE | COMMUNITY
Start: 2019-10-02 | End: 1900-01-01

## 2020-01-21 ENCOUNTER — ASOB RESULT (OUTPATIENT)
Age: 25
End: 2020-01-21

## 2020-01-21 ENCOUNTER — APPOINTMENT (OUTPATIENT)
Dept: ANTEPARTUM | Facility: CLINIC | Age: 25
End: 2020-01-21
Payer: MEDICAID

## 2020-01-21 PROCEDURE — 76816 OB US FOLLOW-UP PER FETUS: CPT

## 2020-01-21 PROCEDURE — 76820 UMBILICAL ARTERY ECHO: CPT

## 2020-01-21 PROCEDURE — 93976 VASCULAR STUDY: CPT

## 2020-01-28 ENCOUNTER — APPOINTMENT (OUTPATIENT)
Dept: ANTEPARTUM | Facility: CLINIC | Age: 25
End: 2020-01-28
Payer: MEDICAID

## 2020-01-28 ENCOUNTER — ASOB RESULT (OUTPATIENT)
Age: 25
End: 2020-01-28

## 2020-01-28 PROCEDURE — 76821 MIDDLE CEREBRAL ARTERY ECHO: CPT

## 2020-01-28 PROCEDURE — 76819 FETAL BIOPHYS PROFIL W/O NST: CPT

## 2020-01-28 PROCEDURE — 76820 UMBILICAL ARTERY ECHO: CPT

## 2020-01-28 PROCEDURE — 93976 VASCULAR STUDY: CPT

## 2020-01-29 ENCOUNTER — APPOINTMENT (OUTPATIENT)
Dept: ANTEPARTUM | Facility: CLINIC | Age: 25
End: 2020-01-29

## 2020-02-04 ENCOUNTER — APPOINTMENT (OUTPATIENT)
Dept: ANTEPARTUM | Facility: CLINIC | Age: 25
End: 2020-02-04
Payer: MEDICAID

## 2020-02-04 ENCOUNTER — ASOB RESULT (OUTPATIENT)
Age: 25
End: 2020-02-04

## 2020-02-04 ENCOUNTER — TRANSCRIPTION ENCOUNTER (OUTPATIENT)
Age: 25
End: 2020-02-04

## 2020-02-04 PROCEDURE — 93976 VASCULAR STUDY: CPT

## 2020-02-04 PROCEDURE — 76820 UMBILICAL ARTERY ECHO: CPT

## 2020-02-04 PROCEDURE — 76816 OB US FOLLOW-UP PER FETUS: CPT

## 2020-02-11 ENCOUNTER — APPOINTMENT (OUTPATIENT)
Dept: ANTEPARTUM | Facility: CLINIC | Age: 25
End: 2020-02-11
Payer: MEDICAID

## 2020-02-11 ENCOUNTER — ASOB RESULT (OUTPATIENT)
Age: 25
End: 2020-02-11

## 2020-02-11 PROCEDURE — 76818 FETAL BIOPHYS PROFILE W/NST: CPT

## 2020-02-11 PROCEDURE — 93976 VASCULAR STUDY: CPT

## 2020-02-11 PROCEDURE — 76820 UMBILICAL ARTERY ECHO: CPT

## 2020-02-11 PROCEDURE — 76821 MIDDLE CEREBRAL ARTERY ECHO: CPT

## 2020-02-18 ENCOUNTER — ASOB RESULT (OUTPATIENT)
Age: 25
End: 2020-02-18

## 2020-02-18 ENCOUNTER — APPOINTMENT (OUTPATIENT)
Dept: ANTEPARTUM | Facility: CLINIC | Age: 25
End: 2020-02-18
Payer: MEDICAID

## 2020-02-18 ENCOUNTER — INPATIENT (INPATIENT)
Facility: HOSPITAL | Age: 25
LOS: 0 days | Discharge: HOME | DRG: 833 | End: 2020-02-19
Attending: OBSTETRICS & GYNECOLOGY | Admitting: OBSTETRICS & GYNECOLOGY
Payer: MEDICAID

## 2020-02-18 VITALS
DIASTOLIC BLOOD PRESSURE: 66 MMHG | TEMPERATURE: 98 F | HEART RATE: 86 BPM | RESPIRATION RATE: 18 BRPM | SYSTOLIC BLOOD PRESSURE: 113 MMHG

## 2020-02-18 DIAGNOSIS — O47.03 FALSE LABOR BEFORE 37 COMPLETED WEEKS OF GESTATION, THIRD TRIMESTER: ICD-10-CM

## 2020-02-18 LAB
APPEARANCE UR: CLEAR — SIGNIFICANT CHANGE UP
BASOPHILS # BLD AUTO: 0.07 K/UL — SIGNIFICANT CHANGE UP (ref 0–0.2)
BASOPHILS NFR BLD AUTO: 0.6 % — SIGNIFICANT CHANGE UP (ref 0–2)
BILIRUB UR-MCNC: NEGATIVE — SIGNIFICANT CHANGE UP
BLD GP AB SCN SERPL QL: SIGNIFICANT CHANGE UP
COLOR SPEC: YELLOW — SIGNIFICANT CHANGE UP
DIFF PNL FLD: NEGATIVE — SIGNIFICANT CHANGE UP
EOSINOPHIL # BLD AUTO: 0.33 K/UL — SIGNIFICANT CHANGE UP (ref 0–0.5)
EOSINOPHIL NFR BLD AUTO: 3 % — SIGNIFICANT CHANGE UP (ref 0–6)
EPI CELLS # UR: SIGNIFICANT CHANGE UP
GLUCOSE UR QL: NEGATIVE MG/DL — SIGNIFICANT CHANGE UP
HCT VFR BLD CALC: 37.4 % — SIGNIFICANT CHANGE UP (ref 34.5–45)
HGB BLD-MCNC: 12 G/DL — SIGNIFICANT CHANGE UP (ref 11.5–15.5)
HIV 1 & 2 AB SERPL IA.RAPID: SIGNIFICANT CHANGE UP
IMM GRANULOCYTES NFR BLD AUTO: 1.3 % — SIGNIFICANT CHANGE UP (ref 0–1.5)
KETONES UR-MCNC: NEGATIVE — SIGNIFICANT CHANGE UP
LEUKOCYTE ESTERASE UR-ACNC: ABNORMAL
LYMPHOCYTES # BLD AUTO: 2.57 K/UL — SIGNIFICANT CHANGE UP (ref 1–3.3)
LYMPHOCYTES # BLD AUTO: 23.2 % — SIGNIFICANT CHANGE UP (ref 13–44)
MCHC RBC-ENTMCNC: 30.2 PG — SIGNIFICANT CHANGE UP (ref 27–34)
MCHC RBC-ENTMCNC: 32.1 GM/DL — SIGNIFICANT CHANGE UP (ref 32–36)
MCV RBC AUTO: 94 FL — SIGNIFICANT CHANGE UP (ref 80–100)
MONOCYTES # BLD AUTO: 1.09 K/UL — HIGH (ref 0–0.9)
MONOCYTES NFR BLD AUTO: 9.8 % — SIGNIFICANT CHANGE UP (ref 2–14)
NEUTROPHILS # BLD AUTO: 6.87 K/UL — SIGNIFICANT CHANGE UP (ref 1.8–7.4)
NEUTROPHILS NFR BLD AUTO: 62.1 % — SIGNIFICANT CHANGE UP (ref 43–77)
NITRITE UR-MCNC: NEGATIVE — SIGNIFICANT CHANGE UP
PH UR: 8 — SIGNIFICANT CHANGE UP (ref 5–8)
PLATELET # BLD AUTO: 227 K/UL — SIGNIFICANT CHANGE UP (ref 150–400)
PROT UR-MCNC: NEGATIVE MG/DL — SIGNIFICANT CHANGE UP
RBC # BLD: 3.98 M/UL — SIGNIFICANT CHANGE UP (ref 3.8–5.2)
RBC # FLD: 12.3 % — SIGNIFICANT CHANGE UP (ref 10.3–14.5)
RBC CASTS # UR COMP ASSIST: NEGATIVE /HPF — SIGNIFICANT CHANGE UP (ref 0–4)
SP GR SPEC: 1.01 — SIGNIFICANT CHANGE UP (ref 1.01–1.02)
UROBILINOGEN FLD QL: NEGATIVE MG/DL — SIGNIFICANT CHANGE UP
WBC # BLD: 11.07 K/UL — HIGH (ref 3.8–10.5)
WBC # FLD AUTO: 11.07 K/UL — HIGH (ref 3.8–10.5)
WBC UR QL: SIGNIFICANT CHANGE UP

## 2020-02-18 PROCEDURE — 76815 OB US LIMITED FETUS(S): CPT | Mod: 26,59

## 2020-02-18 PROCEDURE — 76820 UMBILICAL ARTERY ECHO: CPT

## 2020-02-18 PROCEDURE — 93976 VASCULAR STUDY: CPT

## 2020-02-18 PROCEDURE — 76821 MIDDLE CEREBRAL ARTERY ECHO: CPT | Mod: 26

## 2020-02-18 PROCEDURE — 76821 MIDDLE CEREBRAL ARTERY ECHO: CPT

## 2020-02-18 PROCEDURE — 76819 FETAL BIOPHYS PROFIL W/O NST: CPT

## 2020-02-18 PROCEDURE — 76820 UMBILICAL ARTERY ECHO: CPT | Mod: 26

## 2020-02-18 PROCEDURE — 93976 VASCULAR STUDY: CPT | Mod: 26

## 2020-02-18 PROCEDURE — 76819 FETAL BIOPHYS PROFIL W/O NST: CPT | Mod: 26

## 2020-02-18 RX ORDER — SODIUM CHLORIDE 9 MG/ML
1000 INJECTION, SOLUTION INTRAVENOUS ONCE
Refills: 0 | Status: COMPLETED | OUTPATIENT
Start: 2020-02-18 | End: 2020-02-18

## 2020-02-18 RX ORDER — SODIUM CHLORIDE 9 MG/ML
1000 INJECTION, SOLUTION INTRAVENOUS
Refills: 0 | Status: COMPLETED | OUTPATIENT
Start: 2020-02-18 | End: 2020-02-18

## 2020-02-18 RX ORDER — SODIUM CHLORIDE 9 MG/ML
1000 INJECTION, SOLUTION INTRAVENOUS
Refills: 0 | Status: DISCONTINUED | OUTPATIENT
Start: 2020-02-18 | End: 2020-02-19

## 2020-02-18 RX ADMIN — SODIUM CHLORIDE 1000 MILLILITER(S): 9 INJECTION, SOLUTION INTRAVENOUS at 18:30

## 2020-02-18 RX ADMIN — SODIUM CHLORIDE 250 MILLILITER(S): 9 INJECTION, SOLUTION INTRAVENOUS at 19:05

## 2020-02-18 RX ADMIN — Medication 12 MILLIGRAM(S): at 18:40

## 2020-02-18 NOTE — OB PROVIDER H&P - NSHPPHYSICALEXAM_GEN_ALL_CORE
ICU Vital Signs Last 24 Hrs  T(C): 36.9 (18 Feb 2020 18:00), Max: 36.9 (18 Feb 2020 18:00)  T(F): 98.4 (18 Feb 2020 18:00), Max: 98.4 (18 Feb 2020 18:00)  HR: 86 (18 Feb 2020 18:00) (86 - 86)  BP: 113/66 (18 Feb 2020 18:00) (113/66 - 113/66)  RR: 18 (18 Feb 2020 18:00) (18 - 18)        General: AOx3, NAD  Heart: RRR  Lungs: CTAB  Abd: Soft, nontender, gravid      BMI (kg/m2): 27.9 (02-18-20 @ 18:00)        FHT: 145 bpm, moderate variability + accels no decel   [place]: No CTX noted

## 2020-02-18 NOTE — OB PROVIDER H&P - ASSESSMENT
DAVONTE MAYER is a 25y  at 32w3d by presenting from [facility] for surveillance for oligohydramnios.       PLAN:  - Consent  - Admission labs  - GBS Unknown: No need to treat at this time  - Plan for IVF hydration  - Patient to receive 1st dose of BMTZ at this time  - Continuous toco/FHT  - Maternal/fetal status reassuring  - Admit to L&D  - Plan for official [facility] consult in the AM    D/w Dr. Sagastume

## 2020-02-18 NOTE — OB PROVIDER H&P - HISTORY OF PRESENT ILLNESS
DAVONTE MAYER is a 25y  at 32w3d by presenting from [facility] for surveillance for oligohydramnios.     Patient states that she was at the [facility] office today and was informed that the baby had "low fluid" and was told to come to L&D for fluid hydration. Patient states that she is feeling well. Denies any leakage of fluid, vaginal bleeding or contraction like pain. She is reporting good fetal movement at bedside.           Prenatal course otherwise uncomplicated:    OBhx:   - SAB @ 22 weeks (2018) w/ D&C  PMH: Denies  PSH: D&C  Med: PNV, Folic acid  Allergies: NKDA

## 2020-02-19 ENCOUNTER — TRANSCRIPTION ENCOUNTER (OUTPATIENT)
Age: 25
End: 2020-02-19

## 2020-02-19 VITALS
HEART RATE: 86 BPM | SYSTOLIC BLOOD PRESSURE: 119 MMHG | DIASTOLIC BLOOD PRESSURE: 58 MMHG | RESPIRATION RATE: 18 BRPM | TEMPERATURE: 99 F

## 2020-02-19 DIAGNOSIS — O41.02X1: ICD-10-CM

## 2020-02-19 DIAGNOSIS — Z3A.32 32 WEEKS GESTATION OF PREGNANCY: ICD-10-CM

## 2020-02-19 LAB — T PALLIDUM AB TITR SER: NEGATIVE — SIGNIFICANT CHANGE UP

## 2020-02-19 RX ADMIN — Medication 12 MILLIGRAM(S): at 18:48

## 2020-02-19 NOTE — DISCHARGE NOTE ANTEPARTUM - PATIENT PORTAL LINK FT
You can access the FollowMyHealth Patient Portal offered by [facility] by registering at the following website: [email or web address]. By joining [facility]’s FollowMyHealth portal, you will also be able to view your health information using other applications (apps) compatible with our system.

## 2020-02-19 NOTE — DISCHARGE NOTE ANTEPARTUM - CARE PLAN
Principal Discharge DX:	Oligohydramnios antepartum, second trimester, fetus 1  Goal:	normal fluid  Assessment and plan of treatment:	Patient completed IVF hydration and received course of betamethasone. She should follow-up within 1 week with H clinic. Precautions given to return to hospital if decreased fetal movement, contractions, leakage of fluid, vaginal bleeding.

## 2020-02-19 NOTE — CONSULT NOTE ADULT - ASSESSMENT
DAVONTE MORENO is a 24yo  EDC 2020 at Gestational Age 32 4/7 weeks admitted with oligohydramnios TAMEKA 4.4 <3%tilchristiano.

## 2020-02-19 NOTE — CHART NOTE - NSCHARTNOTEFT_GEN_A_CORE
S: Pt evaluated due to decels on FHT, lying in bed comfortable. Pt stated that she felt the baby move significantly prior to the decel.       O:  Vital Signs Last 24 Hrs  T(C): 36.6 (2020 05:45), Max: 36.9 (2020 17:57)  T(F): 97.88 (2020 05:45), Max: 98.42 (2020 17:57)  HR: 93 (2020 07:40) (73 - 93)  BP: 101/57 (2020 04:18) (101/57 - 127/65)  BP(mean): --  RR: 12 (2020 05:45) (12 - 18)  SpO2: 100% (2020 07:40) (96% - 100%)    SVE: deferred  FHT: period of decel for approx 3 min with a period of recovery then a second decel lasting another 2 minutes.   currently Baseline 130 bpm, + accels, no further decels  [place]: no ctx    A/p    25 y.o  with oligo on sonogram, pending NST and sono.   - continue to monitor, continue FHT  - IV fluid bolus, o2 and left lateral decubitus. S: Pt evaluated due to decels on FHT. She is laying in bed comfortably. Pt stated that she felt the baby move significantly prior to the decel.     O:  Vital Signs Last 24 Hrs  T(C): 36.6 (19 Feb 2020 05:45), Max: 36.9 (18 Feb 2020 17:57)  T(F): 97.88 (19 Feb 2020 05:45), Max: 98.42 (18 Feb 2020 17:57)  HR: 93 (19 Feb 2020 07:40) (73 - 93)  BP: 101/57 (19 Feb 2020 04:18) (101/57 - 127/65)  BP(mean): --  RR: 12 (19 Feb 2020 05:45) (12 - 18)  SpO2: 100% (19 Feb 2020 07:40) (96% - 100%)    FHT: Baseline 130 bpm, Moderate variability, + Accels, Spontaneous decel for approximately 3 min with a period of recovery, then a second decel lasting another 2 minutes. Recovery to baseline after resuscitative measures.  [place]: No ctx    SVE: deferred    A/P: 32 weeks GA admitted with oligohydramnios.    - Resuscitative measures initiated- Lateral positioning, oxygen mask, and IVF bolus.  - Will continue to monitor.    Dr. Gupta was notified. S: Pt evaluated due to decels on FHT. She is laying in bed comfortably. Pt stated that she felt the baby move significantly prior to the decel.     O:  Vital Signs Last 24 Hrs  T(C): 36.6 (19 Feb 2020 05:45), Max: 36.9 (18 Feb 2020 17:57)  T(F): 97.88 (19 Feb 2020 05:45), Max: 98.42 (18 Feb 2020 17:57)  HR: 93 (19 Feb 2020 07:40) (73 - 93)  BP: 101/57 (19 Feb 2020 04:18) (101/57 - 127/65)  BP(mean): --  RR: 12 (19 Feb 2020 05:45) (12 - 18)  SpO2: 100% (19 Feb 2020 07:40) (96% - 100%)    FHT: Baseline 130 bpm, Moderate variability, + Accels, Spontaneous decel for approximately 3 min with a period of recovery, then a second decel lasting another 2 minutes. Recovery to baseline after resuscitative measures.  [place]: No ctx    SVE: deferred    A/P: 32 weeks GA admitted with oligohydramnios.    - Resuscitative measures initiated- Lateral positioning, oxygen mask, and IVF bolus.  - Will continue to monitor.    Dr. Gupta was notified.  agree with above  one isolated decel with good recuperation and variability  TAMEKA was 6.7  MFM recommends second dose of BMZ and d/c home  twice weekly testing for FGR  ppalos

## 2020-02-19 NOTE — CONSULT NOTE ADULT - ATTENDING COMMENTS
Essential primip at [id number] admitted for oligo. Maternal/fetal status reassuring    -rpt TAMEKA  -2nd dose of BMZ  -continue current care  ppalos

## 2020-02-19 NOTE — DISCHARGE NOTE ANTEPARTUM - PLAN OF CARE
normal fluid Patient completed IVF hydration and received course of betamethasone. She should follow-up within 1 week with [facility] clinic. Precautions given to return to hospital if decreased fetal movement, contractions, leakage of fluid, vaginal bleeding.

## 2020-02-19 NOTE — CONSULT NOTE ADULT - SUBJECTIVE AND OBJECTIVE BOX
DAVONTE MAYER is a 26yo  EDC 2020 at Gestational Age 32 4/7 weeks admitted with oligohydramnios TAMEKA 4.4 <3%tile.   She feels well no complaints overnight. +FM, no contractions     OB HISTORY:   G1 2nd trimester loss at 22 weeks D&C    GYN HISTORY: denies     PMH: denies     PSH: D&C    Allergies: No Known Allergies    FAMILY HISTORY:  No pertinent family history in first degree relatives    Social History: Denies alcohol, tobacco and drugs.     REVIEW OF SYSTEMS:  Constitutional: No weight loss, fever, chills, weakness or fatigue.  HEENT: Eyes: No visual loss, blurred vision, double vision or yellow sclerae.   Skin: No rash or itching.  Cardiovascular: No chest pain. No palpitations or edema.  Respiratory: No shortness of breath, cough  Gastrointestinal: No nausea, vomiting or diarrhea. No abdominal pain   Genitourinary: No pain or burning on urination.   Neurological: No headache, dizziness, syncope  Hematologic: No anemia, bleeding or bruising  Psychiatric: No history of depression or anxiety.  Endocrinologic: No reports of sweating, cold or heat intolerance.  Allergies: No history of asthma, hives, eczema or rhinitis.    MEDICATIONS  (STANDING):  lactated ringers. 1000 milliLiter(s) (250 mL/Hr) IV Continuous <Continuous>    MEDICATIONS  (PRN):      Fetal Monitorins baseline with moderate variation. + accelerations, no decelerations. No contractions.     Vital Signs:  Vital Signs Last 24 Hrs  T(C): 36.6 (2020 05:45), Max: 36.9 (2020 17:57)  T(F): 97.88 (2020 05:45), Max: 98.42 (2020 17:57)  HR: 73 (2020 04:18) (73 - 86)  BP: 101/57 (2020 04:18) (101/57 - 127/65)  RR: 12 (2020 05:45) (12 - 18)  SpO2: 96% (2020 04:18) (96% - 97%)    Physical Exam:  General: NAD  CVS: RRR  Lungs: CTA  Abdomen: +BS, soft, NT.  Ext: No edema or calf tenderness.     Labs:                        12.0   11.07 )-----------( 227      ( 2020 19:08 )             37.4

## 2020-02-19 NOTE — DISCHARGE NOTE ANTEPARTUM - HOSPITAL COURSE
Patient is a 26yo  at 32 weeks who was admitted with oligohydramnios and completed IVF hydration. Repeat TAMEKA today with MFM sono tech was 6.87. Of note, EFW 9th%tile. Upon discharge,  testing reassuring and she feels well after complete course of betamethasone. She will have follow-up with high risk team.

## 2020-02-19 NOTE — DISCHARGE NOTE ANTEPARTUM - CARE PROVIDER_API CALL
Chikis Gupta)  Obstetrics and Gynecology  [address] [place] [address]  Phone: [phone]  Fax: [phone]  Follow Up Time:

## 2020-02-25 ENCOUNTER — APPOINTMENT (OUTPATIENT)
Dept: ANTEPARTUM | Facility: CLINIC | Age: 25
End: 2020-02-25
Payer: MEDICAID

## 2020-02-25 ENCOUNTER — ASOB RESULT (OUTPATIENT)
Age: 25
End: 2020-02-25

## 2020-02-25 PROBLEM — O03.9 COMPLETE OR UNSPECIFIED SPONTANEOUS ABORTION WITHOUT COMPLICATION: Chronic | Status: ACTIVE | Noted: 2020-02-18

## 2020-02-25 PROCEDURE — 76818 FETAL BIOPHYS PROFILE W/NST: CPT

## 2020-02-26 PROCEDURE — T1013: CPT

## 2020-02-26 PROCEDURE — 76819 FETAL BIOPHYS PROFIL W/O NST: CPT

## 2020-02-26 PROCEDURE — 86901 BLOOD TYPING SEROLOGIC RH(D): CPT

## 2020-02-26 PROCEDURE — 76805 OB US >/= 14 WKS SNGL FETUS: CPT

## 2020-02-26 PROCEDURE — 85027 COMPLETE CBC AUTOMATED: CPT

## 2020-02-26 PROCEDURE — 87389 HIV-1 AG W/HIV-1&-2 AB AG IA: CPT

## 2020-02-26 PROCEDURE — 86900 BLOOD TYPING SEROLOGIC ABO: CPT

## 2020-02-26 PROCEDURE — 36415 COLL VENOUS BLD VENIPUNCTURE: CPT

## 2020-02-26 PROCEDURE — 81001 URINALYSIS AUTO W/SCOPE: CPT

## 2020-02-26 PROCEDURE — 86850 RBC ANTIBODY SCREEN: CPT

## 2020-02-26 PROCEDURE — 93975 VASCULAR STUDY: CPT

## 2020-02-26 PROCEDURE — 86780 TREPONEMA PALLIDUM: CPT

## 2020-02-26 PROCEDURE — 86703 HIV-1/HIV-2 1 RESULT ANTBDY: CPT

## 2020-02-28 ENCOUNTER — APPOINTMENT (OUTPATIENT)
Dept: ANTEPARTUM | Facility: CLINIC | Age: 25
End: 2020-02-28
Payer: MEDICAID

## 2020-02-28 ENCOUNTER — ASOB RESULT (OUTPATIENT)
Age: 25
End: 2020-02-28

## 2020-02-28 PROCEDURE — 76818 FETAL BIOPHYS PROFILE W/NST: CPT

## 2020-03-02 ENCOUNTER — OUTPATIENT (OUTPATIENT)
Dept: INPATIENT UNIT | Facility: HOSPITAL | Age: 25
LOS: 1 days | End: 2020-03-02
Payer: MEDICAID

## 2020-03-02 ENCOUNTER — EMERGENCY (EMERGENCY)
Facility: HOSPITAL | Age: 25
LOS: 1 days | Discharge: HOME | End: 2020-03-02
Attending: EMERGENCY MEDICINE
Payer: MEDICAID

## 2020-03-02 VITALS — HEART RATE: 74 BPM | SYSTOLIC BLOOD PRESSURE: 112 MMHG | DIASTOLIC BLOOD PRESSURE: 56 MMHG

## 2020-03-02 VITALS
HEART RATE: 99 BPM | TEMPERATURE: 98 F | DIASTOLIC BLOOD PRESSURE: 65 MMHG | SYSTOLIC BLOOD PRESSURE: 110 MMHG | OXYGEN SATURATION: 100 % | HEIGHT: 67 IN | WEIGHT: 177.91 LBS | RESPIRATION RATE: 18 BRPM

## 2020-03-02 VITALS
DIASTOLIC BLOOD PRESSURE: 59 MMHG | HEART RATE: 74 BPM | RESPIRATION RATE: 16 BRPM | SYSTOLIC BLOOD PRESSURE: 122 MMHG | TEMPERATURE: 98 F

## 2020-03-02 DIAGNOSIS — O47.1 FALSE LABOR AT OR AFTER 37 COMPLETED WEEKS OF GESTATION: ICD-10-CM

## 2020-03-02 DIAGNOSIS — O47.03 FALSE LABOR BEFORE 37 COMPLETED WEEKS OF GESTATION, THIRD TRIMESTER: ICD-10-CM

## 2020-03-02 PROCEDURE — T1013: CPT

## 2020-03-02 PROCEDURE — 99284 EMERGENCY DEPT VISIT MOD MDM: CPT

## 2020-03-02 PROCEDURE — G0463: CPT

## 2020-03-02 PROCEDURE — 59025 FETAL NON-STRESS TEST: CPT

## 2020-03-02 PROCEDURE — 99282 EMERGENCY DEPT VISIT SF MDM: CPT

## 2020-03-02 RX ORDER — ONDANSETRON 8 MG/1
4 TABLET, FILM COATED ORAL ONCE
Refills: 0 | Status: DISCONTINUED | OUTPATIENT
Start: 2020-03-02 | End: 2020-03-02

## 2020-03-02 RX ORDER — ONDANSETRON 8 MG/1
4 TABLET, FILM COATED ORAL ONCE
Refills: 0 | Status: COMPLETED | OUTPATIENT
Start: 2020-03-02 | End: 2020-03-02

## 2020-03-02 RX ORDER — SODIUM CHLORIDE 9 MG/ML
1000 INJECTION, SOLUTION INTRAVENOUS ONCE
Refills: 0 | Status: COMPLETED | OUTPATIENT
Start: 2020-03-02 | End: 2020-03-02

## 2020-03-02 RX ORDER — DOXYLAMINE SUCCINATE AND PYRIDOXINE HYDROCHLORIDE, DELAYED RELEASE TABLETS 10 MG/10 MG 10; 10 MG/1; MG/1
2 TABLET, DELAYED RELEASE ORAL
Qty: 60 | Refills: 0
Start: 2020-03-02 | End: 2020-03-31

## 2020-03-02 RX ADMIN — ONDANSETRON 4 MILLIGRAM(S): 8 TABLET, FILM COATED ORAL at 01:51

## 2020-03-02 RX ADMIN — SODIUM CHLORIDE 1000 MILLILITER(S): 9 INJECTION, SOLUTION INTRAVENOUS at 01:45

## 2020-03-02 NOTE — OB PROVIDER TRIAGE NOTE - NSHPPHYSICALEXAM_GEN_ALL_CORE
Vital Signs Last 24 Hrs  T(C): 36.8 (02 Mar 2020 01:24), Max: 36.8 (02 Mar 2020 01:24)  T(F): 98.2 (02 Mar 2020 01:24), Max: 98.24 (02 Mar 2020 01:24)  HR: 74 (02 Mar 2020 01:26) (74 - 99)  BP: 122/59 (02 Mar 2020 01:26) (110/65 - 122/59)  RR: 16 (02 Mar 2020 01:24) (16 - 18)  SpO2: 100% (02 Mar 2020 00:19) (100% - 100%)  General: Alert and oriented x3, no acute distress  Cardiovascular: regular rate and rhythm, no murmurs, rubs or gallops appreciated on exam  Respiratory: clear to auscultation bilaterally  Abdominal: gravid uterus, non-tender to palpation  Pelvic: deferred   Extremities: no redness, tenderness or swelling in lower extremities bilaterally     FHT: baseline 135bpm, moderate variability, +accels, -deccels  [place]: no contractions

## 2020-03-02 NOTE — OB PROVIDER TRIAGE NOTE - NSOBPROVIDERNOTE_OBGYN_ALL_OB_FT
DAVONTE PATE is a 25y  at 34w2d who presented to L&D for nausea and vomiting.     - IV fluids  - zofran IV  - po challenged HERLINDA DAVONTE is a 25y  at 34w2d who presented to L&D for nausea and vomiting.     - IV fluids  - zofran IV  - po challenged    patient presented with nausea and vomiting today only at 34 weeks  fetal heart rte reactive with no contractions  patient status post zofran and fluid challenge  prenatal complicated by low mack and patient with follow up sono tomorrow  last sono on  shows mack 7  patient encouraged to keep her outpatient appointments

## 2020-03-02 NOTE — OB PROVIDER TRIAGE NOTE - HISTORY OF PRESENT ILLNESS
DAVONTE PATE is a 25y  at 34w2d who presented to L&D for nausea and vomiting. She stated that today she has not been able to keep food or water down, however, she has not recently vomited. She stated that she is also having indigestion and heart burn that makes the nausea worse. She has not tried anything to make it better, and eating makes the nausea worse. She was previously admitted for oligohydramnios and received IV hydration. She was beta complete by .  obhx  - SAB @ 22kw  pmhx: none  pshx: none  meds: pnv  allergies: nkda

## 2020-03-02 NOTE — ED PROVIDER NOTE - OBJECTIVE STATEMENT
Patient is a 24 y/o female  currently 34 weeks pregnant presenting to the ed for vomiting. pt reports multiple episodes of vomiting throughout the day (non-bloody, non-bilious). pt reports no abdominal pain, no vaginal bleeding. pt recent complication in pregnancy oligohydramnios. pt denies cp, SOB, fevers, dysuria, back pain

## 2020-03-02 NOTE — ED PROVIDER NOTE - ATTENDING CONTRIBUTION TO CARE
I personally saw the patient with the PA, and completed the key components of the history and physical exam. I then discussed the management plan with the PA.   gen in nad resp clear cardiac nomurmur abd gravid no ttp all 4 quadrants neuro intact

## 2020-03-02 NOTE — ED ADULT TRIAGE NOTE - CHIEF COMPLAINT QUOTE
Pt. complaining of vomiting since earlier this morning.  Pt. endorses 3 episodes on non blood non bilious emesis.  Pt. states episodes occurred 1 hour after eating and has been unable to tolerate PO all day.  Pt. 34 weeks pregnant; recently told she had low amniotic fluid and told she needed to consume a lot of water so pt. is concerned with symptoms today.  Pt. denies pain.

## 2020-03-02 NOTE — ED ADULT TRIAGE NOTE - STATUS:
----- Message from Lubna Aguiar sent at 9/9/2019 11:35 AM CDT -----  Contact: Self  Type: Patient Call Back    Who called:Brendan    What is the request in detail:Patient returned call to discuss results. Please call    Can the clinic reply by [facility]?    Would the patient rather a call back or a response via [facility]? Call    Best call back number:[phone]         [employer]

## 2020-03-03 ENCOUNTER — APPOINTMENT (OUTPATIENT)
Dept: ANTEPARTUM | Facility: CLINIC | Age: 25
End: 2020-03-03
Payer: MEDICAID

## 2020-03-03 ENCOUNTER — ASOB RESULT (OUTPATIENT)
Age: 25
End: 2020-03-03

## 2020-03-03 PROCEDURE — 76818 FETAL BIOPHYS PROFILE W/NST: CPT

## 2020-03-06 ENCOUNTER — APPOINTMENT (OUTPATIENT)
Dept: ANTEPARTUM | Facility: CLINIC | Age: 25
End: 2020-03-06
Payer: MEDICAID

## 2020-03-06 ENCOUNTER — ASOB RESULT (OUTPATIENT)
Age: 25
End: 2020-03-06

## 2020-03-06 PROCEDURE — 76818 FETAL BIOPHYS PROFILE W/NST: CPT

## 2020-03-06 PROCEDURE — 76816 OB US FOLLOW-UP PER FETUS: CPT

## 2020-03-09 NOTE — ED PROVIDER NOTE - GASTROINTESTINAL [+], MLM
VOMITING You can access the FollowMyHealth Patient Portal offered by [facility] by registering at the following website: [email or web address]. By joining [facility]’s FollowMyHealth portal, you will also be able to view your health information using other applications (apps) compatible with our system.

## 2020-03-10 ENCOUNTER — APPOINTMENT (OUTPATIENT)
Dept: ANTEPARTUM | Facility: CLINIC | Age: 25
End: 2020-03-10
Payer: MEDICAID

## 2020-03-10 ENCOUNTER — ASOB RESULT (OUTPATIENT)
Age: 25
End: 2020-03-10

## 2020-03-10 PROCEDURE — 76818 FETAL BIOPHYS PROFILE W/NST: CPT

## 2020-03-13 ENCOUNTER — ASOB RESULT (OUTPATIENT)
Age: 25
End: 2020-03-13

## 2020-03-13 ENCOUNTER — APPOINTMENT (OUTPATIENT)
Dept: ANTEPARTUM | Facility: CLINIC | Age: 25
End: 2020-03-13
Payer: MEDICAID

## 2020-03-13 PROCEDURE — 76821 MIDDLE CEREBRAL ARTERY ECHO: CPT

## 2020-03-13 PROCEDURE — 93976 VASCULAR STUDY: CPT

## 2020-03-13 PROCEDURE — 76820 UMBILICAL ARTERY ECHO: CPT

## 2020-03-13 PROCEDURE — 76818 FETAL BIOPHYS PROFILE W/NST: CPT

## 2020-03-17 ENCOUNTER — ASOB RESULT (OUTPATIENT)
Age: 25
End: 2020-03-17

## 2020-03-17 ENCOUNTER — APPOINTMENT (OUTPATIENT)
Dept: ANTEPARTUM | Facility: CLINIC | Age: 25
End: 2020-03-17
Payer: MEDICAID

## 2020-03-17 PROCEDURE — 76818 FETAL BIOPHYS PROFILE W/NST: CPT

## 2020-03-17 PROCEDURE — 76820 UMBILICAL ARTERY ECHO: CPT

## 2020-03-18 ENCOUNTER — ASOB RESULT (OUTPATIENT)
Age: 25
End: 2020-03-18

## 2020-03-18 ENCOUNTER — INPATIENT (INPATIENT)
Facility: HOSPITAL | Age: 25
LOS: 2 days | Discharge: HOME | DRG: 833 | End: 2020-03-21
Attending: OBSTETRICS & GYNECOLOGY | Admitting: OBSTETRICS & GYNECOLOGY
Payer: MEDICAID

## 2020-03-18 ENCOUNTER — APPOINTMENT (OUTPATIENT)
Dept: ANTEPARTUM | Facility: CLINIC | Age: 25
End: 2020-03-18
Payer: MEDICAID

## 2020-03-18 VITALS — DIASTOLIC BLOOD PRESSURE: 59 MMHG | SYSTOLIC BLOOD PRESSURE: 126 MMHG | HEART RATE: 87 BPM

## 2020-03-18 DIAGNOSIS — O47.03 FALSE LABOR BEFORE 37 COMPLETED WEEKS OF GESTATION, THIRD TRIMESTER: ICD-10-CM

## 2020-03-18 DIAGNOSIS — O26.893 OTHER SPECIFIED PREGNANCY RELATED CONDITIONS, THIRD TRIMESTER: ICD-10-CM

## 2020-03-18 LAB
APPEARANCE UR: CLEAR — SIGNIFICANT CHANGE UP
BACTERIA # UR AUTO: NEGATIVE — SIGNIFICANT CHANGE UP
BASOPHILS # BLD AUTO: 0.06 K/UL — SIGNIFICANT CHANGE UP (ref 0–0.2)
BASOPHILS NFR BLD AUTO: 0.5 % — SIGNIFICANT CHANGE UP (ref 0–2)
BILIRUB UR-MCNC: NEGATIVE — SIGNIFICANT CHANGE UP
BLD GP AB SCN SERPL QL: SIGNIFICANT CHANGE UP
COLOR SPEC: YELLOW — SIGNIFICANT CHANGE UP
DIFF PNL FLD: NEGATIVE — SIGNIFICANT CHANGE UP
EOSINOPHIL # BLD AUTO: 0.15 K/UL — SIGNIFICANT CHANGE UP (ref 0–0.5)
EOSINOPHIL NFR BLD AUTO: 1.2 % — SIGNIFICANT CHANGE UP (ref 0–6)
EPI CELLS # UR: SIGNIFICANT CHANGE UP
GLUCOSE UR QL: NEGATIVE MG/DL — SIGNIFICANT CHANGE UP
HCT VFR BLD CALC: 40.7 % — SIGNIFICANT CHANGE UP (ref 34.5–45)
HGB BLD-MCNC: 13.5 G/DL — SIGNIFICANT CHANGE UP (ref 11.5–15.5)
IMM GRANULOCYTES NFR BLD AUTO: 1.6 % — HIGH (ref 0–1.5)
KETONES UR-MCNC: NEGATIVE — SIGNIFICANT CHANGE UP
LEUKOCYTE ESTERASE UR-ACNC: ABNORMAL
LYMPHOCYTES # BLD AUTO: 2.96 K/UL — SIGNIFICANT CHANGE UP (ref 1–3.3)
LYMPHOCYTES # BLD AUTO: 24.3 % — SIGNIFICANT CHANGE UP (ref 13–44)
MCHC RBC-ENTMCNC: 31.1 PG — SIGNIFICANT CHANGE UP (ref 27–34)
MCHC RBC-ENTMCNC: 33.2 GM/DL — SIGNIFICANT CHANGE UP (ref 32–36)
MCV RBC AUTO: 93.8 FL — SIGNIFICANT CHANGE UP (ref 80–100)
MONOCYTES # BLD AUTO: 1.23 K/UL — HIGH (ref 0–0.9)
MONOCYTES NFR BLD AUTO: 10.1 % — SIGNIFICANT CHANGE UP (ref 2–14)
NEUTROPHILS # BLD AUTO: 7.59 K/UL — HIGH (ref 1.8–7.4)
NEUTROPHILS NFR BLD AUTO: 62.3 % — SIGNIFICANT CHANGE UP (ref 43–77)
NITRITE UR-MCNC: NEGATIVE — SIGNIFICANT CHANGE UP
PH UR: 7 — SIGNIFICANT CHANGE UP (ref 5–8)
PLATELET # BLD AUTO: 253 K/UL — SIGNIFICANT CHANGE UP (ref 150–400)
PROT UR-MCNC: NEGATIVE MG/DL — SIGNIFICANT CHANGE UP
RBC # BLD: 4.34 M/UL — SIGNIFICANT CHANGE UP (ref 3.8–5.2)
RBC # FLD: 12.6 % — SIGNIFICANT CHANGE UP (ref 10.3–14.5)
RBC CASTS # UR COMP ASSIST: NEGATIVE /HPF — SIGNIFICANT CHANGE UP (ref 0–4)
SP GR SPEC: 1 — LOW (ref 1.01–1.02)
UROBILINOGEN FLD QL: NEGATIVE MG/DL — SIGNIFICANT CHANGE UP
WBC # BLD: 12.18 K/UL — HIGH (ref 3.8–10.5)
WBC # FLD AUTO: 12.18 K/UL — HIGH (ref 3.8–10.5)
WBC UR QL: SIGNIFICANT CHANGE UP

## 2020-03-18 PROCEDURE — 76819 FETAL BIOPHYS PROFIL W/O NST: CPT

## 2020-03-18 RX ORDER — AMPICILLIN TRIHYDRATE 250 MG
2 CAPSULE ORAL ONCE
Refills: 0 | Status: COMPLETED | OUTPATIENT
Start: 2020-03-18 | End: 2020-03-18

## 2020-03-18 RX ORDER — AMPICILLIN TRIHYDRATE 250 MG
1 CAPSULE ORAL EVERY 4 HOURS
Refills: 0 | Status: DISCONTINUED | OUTPATIENT
Start: 2020-03-18 | End: 2020-03-19

## 2020-03-18 RX ORDER — SODIUM CHLORIDE 9 MG/ML
1000 INJECTION, SOLUTION INTRAVENOUS
Refills: 0 | Status: DISCONTINUED | OUTPATIENT
Start: 2020-03-18 | End: 2020-03-19

## 2020-03-18 RX ORDER — CITRIC ACID/SODIUM CITRATE 300-500 MG
30 SOLUTION, ORAL ORAL ONCE
Refills: 0 | Status: DISCONTINUED | OUTPATIENT
Start: 2020-03-18 | End: 2020-03-19

## 2020-03-18 RX ORDER — OXYTOCIN 10 UNIT/ML
2 VIAL (ML) INJECTION
Qty: 30 | Refills: 0 | Status: DISCONTINUED | OUTPATIENT
Start: 2020-03-18 | End: 2020-03-19

## 2020-03-18 RX ORDER — OXYTOCIN 10 UNIT/ML
333.33 VIAL (ML) INJECTION
Qty: 20 | Refills: 0 | Status: COMPLETED | OUTPATIENT
Start: 2020-03-18 | End: 2020-03-18

## 2020-03-18 RX ADMIN — SODIUM CHLORIDE 125 MILLILITER(S): 9 INJECTION, SOLUTION INTRAVENOUS at 14:57

## 2020-03-18 RX ADMIN — Medication 108 GRAM(S): at 23:01

## 2020-03-18 RX ADMIN — SODIUM CHLORIDE 125 MILLILITER(S): 9 INJECTION, SOLUTION INTRAVENOUS at 21:00

## 2020-03-18 RX ADMIN — Medication 108 GRAM(S): at 19:00

## 2020-03-18 RX ADMIN — Medication 216 GRAM(S): at 14:57

## 2020-03-18 NOTE — OB PROVIDER H&P - HISTORY OF PRESENT ILLNESS
DAVONTE MAYER is a 25y  at 36w4d presenting for IOL after having been sent in by his MFM for Oligohydramnios with most recent TAMEKA of 4.8 (today).   Pt has not complaints at this time. Pt denies complications during this pregnancy other than the Oligo.    - LOF, -VB, - CTXs, + Fetal movement.    Prenatal course complicated by Oligohydramnios     OBhx: Demise at 22w for placenta abruptio   PMH: none  PSH: none  Med: PNV  Allergies: none DAVONTE MAYER is a 25y  at 36w4d presenting for IOL after having been sent in by his MFM for Oligohydramnios with most recent TAMEKA of 4.8 (today).   Pt has not complaints at this time. Pt denies complications during this pregnancy other than the Oligo.    - LOF, -VB, - CTXs, + Fetal movement.    Prenatal course complicated by SGA @ 4% on  and Oligohydramnios on today's sonogram     OBhx: Demise at 22w for placenta abruptio   PMH: none  PSH: none  Med: PNV  Allergies: none

## 2020-03-18 NOTE — CHART NOTE - NSCHARTNOTEFT_GEN_A_CORE
Patient is having an induction of labor for oligohydramnios. She is not in significant pain at this time.     Vital Signs Last 24 Hrs  T(C): 36.9 (18 Mar 2020 20:56), Max: 36.9 (18 Mar 2020 20:56)  T(F): 98.42 (18 Mar 2020 20:56), Max: 98.42 (18 Mar 2020 20:56)  HR: 75 (18 Mar 2020 20:58) (75 - 87)  BP: 122/64 (18 Mar 2020 20:58) (109/56 - 126/59)  RR: 16 (18 Mar 2020 17:00) (16 - 16)  SVE: 3/70/-2  FHT: baseline 150bpm, moderate variability, +accels, -deccels  [place]: irregular contractions q2-6 minutes    a/p  Patient is making cervical change with cytotec.     - switch to pitocin for induction    d/w Dr. Garcia

## 2020-03-18 NOTE — OB PROVIDER H&P - ASSESSMENT
DAVONTE MAYER is a 25y  at 36w4d presenting for IOL after having been sent in by his MFM for Oligohydramnios with most recent TAMEKA of 4.8 (today).     - Consent  - Admission labs - CBC w/ diff, T&S with Rh confirmatory, UA, Syphilis, Rubeola  - GBS +, will give Ampicillin per protocol   - Will induce with Cytotec   - Admit to L&D    D/w Alonso

## 2020-03-18 NOTE — OB PROVIDER H&P - NSHPPHYSICALEXAM_GEN_ALL_CORE
General: WEll nourished in NAD  Abd: appropriately gravid   SVE: 1/60/-2    BMI (kg/m2): 29 (03-18-20 @ 12:47), 27.9 (03-02-20 @ 00:19), 27.9 (02-18-20 @ 18:00)    Vital Signs Last 24 Hrs  T(C): 36.8 (18 Mar 2020 12:47), Max: 36.8 (18 Mar 2020 12:47)  T(F): 98.2 (18 Mar 2020 12:47), Max: 98.2 (18 Mar 2020 12:47)  HR: 87 (18 Mar 2020 12:47) (87 - 87)  BP: 126/59 (18 Mar 2020 12:47) (126/59 - 126/59)  RR: 16 (18 Mar 2020 12:47) (16 - 16)    FHT: 150bpm - category I tracing with moderate variability.  [place]: Ctxs infrequent    [place]: Vertex

## 2020-03-18 NOTE — OB PROVIDER H&P - ATTENDING COMMENTS
Multip with IUP @ [id number] admitted as an IOL from [facility] due to SGA and oligohydramnios. GBSpos. maternal/fetal status reassuring    -admit  -routine IOL orders  -Cytotec for IOL as venegas score not favorable  -amp for GBS proph  -consents explained and signed  all questions and concerns discussed  patient and significant others understand and agree with plan  ppalos

## 2020-03-19 ENCOUNTER — TRANSCRIPTION ENCOUNTER (OUTPATIENT)
Age: 25
End: 2020-03-19

## 2020-03-19 ENCOUNTER — RESULT REVIEW (OUTPATIENT)
Age: 25
End: 2020-03-19

## 2020-03-19 LAB — T PALLIDUM AB TITR SER: NEGATIVE — SIGNIFICANT CHANGE UP

## 2020-03-19 PROCEDURE — 88307 TISSUE EXAM BY PATHOLOGIST: CPT | Mod: 26

## 2020-03-19 RX ORDER — TETANUS TOXOID, REDUCED DIPHTHERIA TOXOID AND ACELLULAR PERTUSSIS VACCINE, ADSORBED 5; 2.5; 8; 8; 2.5 [IU]/.5ML; [IU]/.5ML; UG/.5ML; UG/.5ML; UG/.5ML
0.5 SUSPENSION INTRAMUSCULAR ONCE
Refills: 0 | Status: DISCONTINUED | OUTPATIENT
Start: 2020-03-19 | End: 2020-03-21

## 2020-03-19 RX ORDER — AER TRAVELER 0.5 G/1
1 SOLUTION RECTAL; TOPICAL EVERY 4 HOURS
Refills: 0 | Status: DISCONTINUED | OUTPATIENT
Start: 2020-03-19 | End: 2020-03-21

## 2020-03-19 RX ORDER — SODIUM CHLORIDE 9 MG/ML
3 INJECTION INTRAMUSCULAR; INTRAVENOUS; SUBCUTANEOUS EVERY 8 HOURS
Refills: 0 | Status: DISCONTINUED | OUTPATIENT
Start: 2020-03-19 | End: 2020-03-21

## 2020-03-19 RX ORDER — ACETAMINOPHEN 500 MG
975 TABLET ORAL
Refills: 0 | Status: DISCONTINUED | OUTPATIENT
Start: 2020-03-19 | End: 2020-03-21

## 2020-03-19 RX ORDER — BENZOCAINE 10 %
1 GEL (GRAM) MUCOUS MEMBRANE EVERY 6 HOURS
Refills: 0 | Status: DISCONTINUED | OUTPATIENT
Start: 2020-03-19 | End: 2020-03-21

## 2020-03-19 RX ORDER — OXYCODONE HYDROCHLORIDE 5 MG/1
5 TABLET ORAL ONCE
Refills: 0 | Status: DISCONTINUED | OUTPATIENT
Start: 2020-03-19 | End: 2020-03-21

## 2020-03-19 RX ORDER — LANOLIN
1 OINTMENT (GRAM) TOPICAL EVERY 6 HOURS
Refills: 0 | Status: DISCONTINUED | OUTPATIENT
Start: 2020-03-19 | End: 2020-03-21

## 2020-03-19 RX ORDER — MAGNESIUM HYDROXIDE 400 MG/1
30 TABLET, CHEWABLE ORAL
Refills: 0 | Status: DISCONTINUED | OUTPATIENT
Start: 2020-03-19 | End: 2020-03-21

## 2020-03-19 RX ORDER — GLYCERIN ADULT
1 SUPPOSITORY, RECTAL RECTAL AT BEDTIME
Refills: 0 | Status: DISCONTINUED | OUTPATIENT
Start: 2020-03-19 | End: 2020-03-21

## 2020-03-19 RX ORDER — FOLIC ACID 0.8 MG
1 TABLET ORAL
Qty: 0 | Refills: 0 | DISCHARGE

## 2020-03-19 RX ORDER — DIPHENHYDRAMINE HCL 50 MG
25 CAPSULE ORAL EVERY 6 HOURS
Refills: 0 | Status: DISCONTINUED | OUTPATIENT
Start: 2020-03-19 | End: 2020-03-21

## 2020-03-19 RX ORDER — IBUPROFEN 200 MG
600 TABLET ORAL EVERY 6 HOURS
Refills: 0 | Status: COMPLETED | OUTPATIENT
Start: 2020-03-19 | End: 2021-02-15

## 2020-03-19 RX ORDER — IBUPROFEN 200 MG
600 TABLET ORAL EVERY 6 HOURS
Refills: 0 | Status: DISCONTINUED | OUTPATIENT
Start: 2020-03-19 | End: 2020-03-21

## 2020-03-19 RX ORDER — OXYTOCIN 10 UNIT/ML
333.33 VIAL (ML) INJECTION
Qty: 20 | Refills: 0 | Status: DISCONTINUED | OUTPATIENT
Start: 2020-03-19 | End: 2020-03-21

## 2020-03-19 RX ORDER — PRAMOXINE HYDROCHLORIDE 150 MG/15G
1 AEROSOL, FOAM RECTAL EVERY 4 HOURS
Refills: 0 | Status: DISCONTINUED | OUTPATIENT
Start: 2020-03-19 | End: 2020-03-21

## 2020-03-19 RX ORDER — OXYCODONE HYDROCHLORIDE 5 MG/1
5 TABLET ORAL
Refills: 0 | Status: DISCONTINUED | OUTPATIENT
Start: 2020-03-19 | End: 2020-03-21

## 2020-03-19 RX ORDER — HYDROCORTISONE 1 %
1 OINTMENT (GRAM) TOPICAL EVERY 6 HOURS
Refills: 0 | Status: DISCONTINUED | OUTPATIENT
Start: 2020-03-19 | End: 2020-03-21

## 2020-03-19 RX ORDER — KETOROLAC TROMETHAMINE 30 MG/ML
30 SYRINGE (ML) INJECTION ONCE
Refills: 0 | Status: DISCONTINUED | OUTPATIENT
Start: 2020-03-19 | End: 2020-03-19

## 2020-03-19 RX ORDER — DIBUCAINE 1 %
1 OINTMENT (GRAM) RECTAL EVERY 6 HOURS
Refills: 0 | Status: DISCONTINUED | OUTPATIENT
Start: 2020-03-19 | End: 2020-03-21

## 2020-03-19 RX ORDER — SIMETHICONE 80 MG/1
80 TABLET, CHEWABLE ORAL EVERY 4 HOURS
Refills: 0 | Status: DISCONTINUED | OUTPATIENT
Start: 2020-03-19 | End: 2020-03-21

## 2020-03-19 RX ADMIN — SODIUM CHLORIDE 3 MILLILITER(S): 9 INJECTION INTRAMUSCULAR; INTRAVENOUS; SUBCUTANEOUS at 13:57

## 2020-03-19 RX ADMIN — Medication 2 MILLIUNIT(S)/MIN: at 00:04

## 2020-03-19 RX ADMIN — Medication 1000 MILLIUNIT(S)/MIN: at 09:41

## 2020-03-19 RX ADMIN — SODIUM CHLORIDE 125 MILLILITER(S): 9 INJECTION, SOLUTION INTRAVENOUS at 08:24

## 2020-03-19 RX ADMIN — Medication 108 GRAM(S): at 03:31

## 2020-03-19 RX ADMIN — Medication 30 MILLIGRAM(S): at 11:24

## 2020-03-19 RX ADMIN — SODIUM CHLORIDE 3 MILLILITER(S): 9 INJECTION INTRAMUSCULAR; INTRAVENOUS; SUBCUTANEOUS at 22:00

## 2020-03-19 RX ADMIN — Medication 108 GRAM(S): at 07:00

## 2020-03-19 NOTE — OB RN DELIVERY SUMMARY - NS_SEPSISRSKCALC_OBGYN_ALL_OB_FT
EOS calculated successfully. EOS Risk Factor: 0.5/1000 live births ([facility] national incidence); GA=36w5d; Temp=98.42; ROM=1.65; GBS='Unknown'; Antibiotics='GBS specific antibiotics > 2 hrs prior to birth'

## 2020-03-19 NOTE — DISCHARGE NOTE OB - PATIENT PORTAL LINK FT
You can access the FollowMyHealth Patient Portal offered by [facility] by registering at the following website: [email or web address]. By joining [employer]’s FollowMyHealth portal, you will also be able to view your health information using other applications (apps) compatible with our system.

## 2020-03-19 NOTE — DISCHARGE NOTE OB - CARE PROVIDER_API CALL
Maryellen Solo)  Obstetrics and Gynecology  [address] [place] [address]  Phone: [phone]  Fax: [phone]  Follow Up Time:

## 2020-03-19 NOTE — DISCHARGE NOTE OB - HOSPITAL COURSE
Patient is a 24yo  who had an induction of labor for oligohydramnios and fetal growth restriction who delivered via spontaneous vaginal delivery. She was transferred to postpartum unit without complications during her stay. Upon discharge she is voiding, tolerating PO, ambulating, and pain is well controlled.

## 2020-03-19 NOTE — CHART NOTE - NSCHARTNOTEFT_GEN_A_CORE
S: Patient doing well, requires epidural     O:   Vital Signs Last 24 Hrs  T(C): 36.6 (19 Mar 2020 03:32), Max: 36.9 (18 Mar 2020 20:56)  T(F): 97.88 (19 Mar 2020 03:32), Max: 98.42 (18 Mar 2020 20:56)  HR: 74 (19 Mar 2020 03:32) (67 - 87)  BP: 125/64 (19 Mar 2020 04:47) (98/58 - 126/59)  RR: 16 (18 Mar 2020 17:00) (16 - 16)    FHT: category 1   [place]: ctx q 2-3 minutes     Abdomen: soft, nontender   SVE: 490/-1 vertex     A/P   Patient is a 26yo  at 36+ weeks here for IOL for oligohydramnios   Will continue pitocin and proceed with epidural, she is making cervical change     Dr. Bingham aware

## 2020-03-19 NOTE — CHART NOTE - NSCHARTNOTEFT_GEN_A_CORE
Pt is s/p epidural, adequate pain relief. Currently patient is nauseous, vomiting  Vital Signs Last 24 Hrs  T(C): 36.7 (19 Mar 2020 05:38), Max: 36.9 (18 Mar 2020 20:56)  T(F): 98.1 (19 Mar 2020 05:38), Max: 98.42 (18 Mar 2020 20:56)  HR: 76 (19 Mar 2020 06:39) (63 - 100)  BP: 99/52 (19 Mar 2020 06:39) (98/58 - 126/59)  RR: 15 (19 Mar 2020 05:53) (15 - 16)  SpO2: 99% (19 Mar 2020 05:53) (99% - 100%)    FETAL HEART RATE: 140, moderate variability, + accelerations, no decelerations    [place]: contractions every 2-3 min.    CERVICAL EXAM: 4.5/90/-1 bulging bag, minimal bleeding with small clots identified at the introitus    PAIN SCALE (0-10): adequate epidural    IMPRESSION: Term IUP, IOL for Oligo and FGR, 4th percentile    INTERVENTIONS: continue with current management, reexamine as indicated.

## 2020-03-19 NOTE — OB PROVIDER DELIVERY SUMMARY - NSPROVIDERDELIVERYNOTE_OBGYN_ALL_OB_FT
24yo  presenting for IOL for oligo and SGA. Induced with cytotec and pitocin  Patient felt rectal pressure and was found to be fully dilated, 0 station. She pushed effectively for 15 minutes. In conjunction with maternal effort, she delivered a viable male infant at 09:41. Vertex delivered without difficulty, shoulders then delivered without difficulty. Placenta delivered spontaneously and intact at 09:43. Pitocin started. Excellent hemostasis was achieved. Perineum and vagina were inspected and a 1st degree perineal and bilateral vaginal lacerations were noted and repaired with chromic. Apgars 9,9. .

## 2020-03-19 NOTE — CHART NOTE - NSCHARTNOTEFT_GEN_A_CORE
Patient seen at bedside, resting comfortably  She was checked and found to be 10/100/+1  AROM clear fluid    Vital Signs Last 24 Hrs  T(C): 36.9 (19 Mar 2020 07:23), Max: 36.9 (18 Mar 2020 20:56)  T(F): 98.42 (19 Mar 2020 07:23), Max: 98.42 (18 Mar 2020 20:56)  HR: 67 (19 Mar 2020 07:53) (63 - 100)  BP: 113/58 (19 Mar 2020 07:53) (98/58 - 126/59)  RR: 18 (19 Mar 2020 07:23) (15 - 18)  SpO2: 99% (19 Mar 2020 05:53) (99% - 100%)    FHT: 145bpm - cat I tracing  [place]: Ctxs every 2min    Continue pitocin  epidural in place  Will labor down

## 2020-03-19 NOTE — CHART NOTE - NSCHARTNOTEFT_GEN_A_CORE
Patient is having an induction of labor for oligohydramnios and SGA    Vital Signs Last 24 Hrs  T(C): 36.9 (19 Mar 2020 00:00), Max: 36.9 (18 Mar 2020 20:56)  T(F): 98.42 (19 Mar 2020 00:00), Max: 98.42 (18 Mar 2020 20:56)  HR: 67 (19 Mar 2020 00:07) (67 - 87)  BP: 110/64 (19 Mar 2020 00:07) (109/56 - 126/59)  RR: 16 (18 Mar 2020 17:00) (16 - 16)    FHT: baseline 135bpm, moderate variability, +accels, -deccels  [place]: irregular contractions q1-2 minutes    a/p   Patient is having an induction of labor for oligohydramnios and SGA. She is on pitocin @ 4.     - continue pitocin

## 2020-03-19 NOTE — DISCHARGE NOTE OB - CARE PLAN
Principal Discharge DX:	 (normal spontaneous vaginal delivery)  Goal:	rapid recovery  Assessment and plan of treatment:	Please call your provider to schedule postpartum visit in 3 weeks. Take medications as directed, regular diet, activity as tolerated. Exclusive breast feeding for the first 6 months is recommended. Nothing per vagina for 6 weeks (incl. sex, douching, etc). If you have additional concerns, please inform your provider.

## 2020-03-20 ENCOUNTER — APPOINTMENT (OUTPATIENT)
Dept: ANTEPARTUM | Facility: CLINIC | Age: 25
End: 2020-03-20

## 2020-03-20 LAB
HCT VFR BLD CALC: 35.4 % — SIGNIFICANT CHANGE UP (ref 34.5–45)
HGB BLD-MCNC: 11.6 G/DL — SIGNIFICANT CHANGE UP (ref 11.5–15.5)

## 2020-03-20 RX ORDER — DOCUSATE SODIUM 100 MG
1 CAPSULE ORAL
Qty: 30 | Refills: 0
Start: 2020-03-20

## 2020-03-20 RX ORDER — IBUPROFEN 200 MG
1 TABLET ORAL
Qty: 30 | Refills: 0
Start: 2020-03-20

## 2020-03-20 RX ADMIN — Medication 975 MILLIGRAM(S): at 15:22

## 2020-03-20 RX ADMIN — Medication 600 MILLIGRAM(S): at 18:11

## 2020-03-20 RX ADMIN — Medication 600 MILLIGRAM(S): at 19:04

## 2020-03-20 RX ADMIN — SODIUM CHLORIDE 3 MILLILITER(S): 9 INJECTION INTRAMUSCULAR; INTRAVENOUS; SUBCUTANEOUS at 07:12

## 2020-03-20 RX ADMIN — Medication 600 MILLIGRAM(S): at 02:00

## 2020-03-20 RX ADMIN — Medication 600 MILLIGRAM(S): at 12:17

## 2020-03-20 RX ADMIN — Medication 600 MILLIGRAM(S): at 12:47

## 2020-03-20 RX ADMIN — Medication 1 TABLET(S): at 12:17

## 2020-03-20 RX ADMIN — Medication 600 MILLIGRAM(S): at 01:05

## 2020-03-20 RX ADMIN — Medication 975 MILLIGRAM(S): at 16:00

## 2020-03-20 NOTE — PROGRESS NOTE ADULT - ASSESSMENT
A/P: Patient is a 25y  s/p  @36w5d after IOL for oligo and FGR, now PPD1-- doing well postpartum  - Stable  - Hgb 13.5 -> 11.6 postpartum  - Pain: well controlled on PO pain meds  - GI: Regular diet  - : voiding  - DVT prophylaxis: ambulate  - Dispo: PPD 2, unless otherwise specified

## 2020-03-20 NOTE — PROGRESS NOTE ADULT - SUBJECTIVE AND OBJECTIVE BOX
DAVONTE MAYER is a 25y  s/p  @36w5d after IOL for oligo and FGR, now PPD1 of a viable male infant.     SUBJECTIVE:  Patient has no complaints, no acute events overnight.  Pain is well controlled with PRN pain medication.   She is ambulating, voiding, tolerating PO. Denies N/V.  minimal lochia.      OBJECTIVE:  Physical exam:  General: AOx3, NAD.  Abdomen: +BS, Soft, appropriately tender to palpitation, firm uterine fundus at umbilicus.  Ext: No DVT signs, warm extremities.    Vital Signs Last 24 Hrs  T(C): 36.8 (19 Mar 2020 20:18), Max: 36.9 (19 Mar 2020 07:23)  T(F): 98.2 (19 Mar 2020 20:18), Max: 98.42 (19 Mar 2020 07:23)  HR: 83 (19 Mar 2020 20:18) (66 - 120)  BP: 118/76 (19 Mar 2020 20:18) (91/54 - 129/53)  RR: 22 (19 Mar 2020 20:18) (16 - 22)    LABS:                        11.6   x     )-----------( x        ( 20 Mar 2020 05:46 )             35.4

## 2020-03-21 VITALS
HEART RATE: 69 BPM | RESPIRATION RATE: 18 BRPM | DIASTOLIC BLOOD PRESSURE: 70 MMHG | TEMPERATURE: 98 F | SYSTOLIC BLOOD PRESSURE: 112 MMHG

## 2020-03-21 PROCEDURE — 86780 TREPONEMA PALLIDUM: CPT

## 2020-03-21 PROCEDURE — 85027 COMPLETE CBC AUTOMATED: CPT

## 2020-03-21 PROCEDURE — 85014 HEMATOCRIT: CPT

## 2020-03-21 PROCEDURE — T1013: CPT

## 2020-03-21 PROCEDURE — 85018 HEMOGLOBIN: CPT

## 2020-03-21 PROCEDURE — 88307 TISSUE EXAM BY PATHOLOGIST: CPT

## 2020-03-21 PROCEDURE — 81001 URINALYSIS AUTO W/SCOPE: CPT

## 2020-03-21 PROCEDURE — 86900 BLOOD TYPING SEROLOGIC ABO: CPT

## 2020-03-21 PROCEDURE — 86850 RBC ANTIBODY SCREEN: CPT

## 2020-03-21 PROCEDURE — 86901 BLOOD TYPING SEROLOGIC RH(D): CPT

## 2020-03-21 PROCEDURE — 36415 COLL VENOUS BLD VENIPUNCTURE: CPT

## 2020-03-21 RX ADMIN — Medication 975 MILLIGRAM(S): at 06:17

## 2020-03-21 RX ADMIN — Medication 600 MILLIGRAM(S): at 11:56

## 2020-03-21 RX ADMIN — Medication 975 MILLIGRAM(S): at 05:40

## 2020-03-21 RX ADMIN — Medication 600 MILLIGRAM(S): at 12:55

## 2020-03-21 NOTE — PROGRESS NOTE ADULT - SUBJECTIVE AND OBJECTIVE BOX
DAVONTE MAYER is a 24yo  now PPD#2 s/p spontaneous vaginal delivery at 36 5/7 weeks gestation 2/2 oligohydramnios and IUGR    S:    The patient has no complaints.  Pain controlled with current medications.   She is ambulating without difficulty and tolerating PO   + flatus/+BM/+ voiding   She desires to go home today    O:    T(C): 36.9 (20 @ 22:00), Max: 36.9 (20 @ 22:00)  HR: 62 (20 @ 22:00) (62 - 79)  BP: 97/62 (20 @ 22:00) (97/62 - 107/69)  RR: 20 (20 @ 22:00) (20 - 20)  SpO2: 98% (20 @ 22:00) (98% - 98%)    Gen: NAD, AOx3  CV: RRR  Pulm: CTAB  Breast: nontender, non-engorged   Abdomen:  soft, non-tender, non-distended, +bowel sounds.  Uterus:  Fundus firm below umbilicus  VE:  +lochia  Ext:  Non-tender.                          11.6   x     )-----------( x        ( 20 Mar 2020 05:46 )             35.4

## 2020-03-21 NOTE — PROGRESS NOTE ADULT - ASSESSMENT
A/P:  Patient is a 26yo  now PPD#2 s/p spontaneous vaginal delivery at 36 5/7 weeks gestation 2/2 oligohydramnios and IUGR  -Stable  -Voiding, tolerating PO, bowel function nml   -Advance care as tolerated   -Continue routine postpartum care and education.  -Healthy male infant, declines circumcision.  -Stable for discharge home

## 2020-03-24 ENCOUNTER — APPOINTMENT (OUTPATIENT)
Dept: ANTEPARTUM | Facility: CLINIC | Age: 25
End: 2020-03-24

## 2020-03-24 LAB — SURGICAL PATHOLOGY STUDY: SIGNIFICANT CHANGE UP

## 2020-03-27 ENCOUNTER — APPOINTMENT (OUTPATIENT)
Dept: ANTEPARTUM | Facility: CLINIC | Age: 25
End: 2020-03-27

## 2020-03-31 ENCOUNTER — APPOINTMENT (OUTPATIENT)
Dept: ANTEPARTUM | Facility: CLINIC | Age: 25
End: 2020-03-31

## 2020-04-03 ENCOUNTER — APPOINTMENT (OUTPATIENT)
Dept: ANTEPARTUM | Facility: CLINIC | Age: 25
End: 2020-04-03

## 2020-04-07 ENCOUNTER — APPOINTMENT (OUTPATIENT)
Dept: ANTEPARTUM | Facility: CLINIC | Age: 25
End: 2020-04-07

## 2020-04-10 ENCOUNTER — APPOINTMENT (OUTPATIENT)
Dept: ANTEPARTUM | Facility: CLINIC | Age: 25
End: 2020-04-10

## 2021-03-17 NOTE — ED PROVIDER NOTE - PATIENT PORTAL LINK FT
69
You can access the FollowMyHealth Patient Portal offered by [facility] by registering at the following website: [email or web address]. By joining [employer]’s FollowMyHealth portal, you will also be able to view your health information using other applications (apps) compatible with our system.

## 2021-06-22 NOTE — PATIENT PROFILE OB - BREAST MILK PROVIDES COLOSTRUM THAT IS HIGH IN PROTEIN
Consent (Scalp)/Introductory Paragraph: The rationale for Mohs was explained to the patient and consent was obtained. The risks, benefits and alternatives to therapy were discussed in detail. Specifically, the risks of changes in hair growth pattern secondary to repair, infection, scarring, bleeding, prolonged wound healing, incomplete removal, allergy to anesthesia, nerve injury and recurrence were addressed. Prior to the procedure, the treatment site was clearly identified and confirmed by the patient. All components of Universal Protocol/PAUSE Rule completed. Statement Selected

## 2022-08-14 ENCOUNTER — EMERGENCY (EMERGENCY)
Facility: HOSPITAL | Age: 27
LOS: 1 days | Discharge: HOME | End: 2022-08-14
Attending: EMERGENCY MEDICINE
Payer: MEDICAID

## 2022-08-14 VITALS
HEART RATE: 97 BPM | RESPIRATION RATE: 18 BRPM | SYSTOLIC BLOOD PRESSURE: 135 MMHG | DIASTOLIC BLOOD PRESSURE: 75 MMHG | OXYGEN SATURATION: 99 % | TEMPERATURE: 98 F | WEIGHT: 175.05 LBS | HEIGHT: 67 IN

## 2022-08-14 LAB
ALBUMIN SERPL ELPH-MCNC: 4.1 G/DL — SIGNIFICANT CHANGE UP (ref 3.3–5.2)
ALP SERPL-CCNC: 61 U/L — SIGNIFICANT CHANGE UP (ref 40–120)
ALT FLD-CCNC: 20 U/L — SIGNIFICANT CHANGE UP
ANION GAP SERPL CALC-SCNC: 12 MMOL/L — SIGNIFICANT CHANGE UP (ref 5–17)
APPEARANCE UR: CLEAR — SIGNIFICANT CHANGE UP
AST SERPL-CCNC: 15 U/L — SIGNIFICANT CHANGE UP
BACTERIA # UR AUTO: ABNORMAL
BASOPHILS # BLD AUTO: 0.04 K/UL — SIGNIFICANT CHANGE UP (ref 0–0.2)
BASOPHILS NFR BLD AUTO: 0.5 % — SIGNIFICANT CHANGE UP (ref 0–2)
BILIRUB SERPL-MCNC: 0.3 MG/DL — LOW (ref 0.4–2)
BILIRUB UR-MCNC: NEGATIVE — SIGNIFICANT CHANGE UP
BLD GP AB SCN SERPL QL: SIGNIFICANT CHANGE UP
BUN SERPL-MCNC: 9.9 MG/DL — SIGNIFICANT CHANGE UP (ref 8–20)
CALCIUM SERPL-MCNC: 9.2 MG/DL — SIGNIFICANT CHANGE UP (ref 8.4–10.5)
CHLORIDE SERPL-SCNC: 103 MMOL/L — SIGNIFICANT CHANGE UP (ref 98–107)
CO2 SERPL-SCNC: 21 MMOL/L — LOW (ref 22–29)
COLOR SPEC: YELLOW — SIGNIFICANT CHANGE UP
CREAT SERPL-MCNC: 0.71 MG/DL — SIGNIFICANT CHANGE UP (ref 0.5–1.3)
DIFF PNL FLD: ABNORMAL
EGFR: 119 ML/MIN/1.73M2 — SIGNIFICANT CHANGE UP
EOSINOPHIL # BLD AUTO: 0.14 K/UL — SIGNIFICANT CHANGE UP (ref 0–0.5)
EOSINOPHIL NFR BLD AUTO: 1.9 % — SIGNIFICANT CHANGE UP (ref 0–6)
EPI CELLS # UR: SIGNIFICANT CHANGE UP
GLUCOSE SERPL-MCNC: 98 MG/DL — SIGNIFICANT CHANGE UP (ref 70–99)
GLUCOSE UR QL: NEGATIVE MG/DL — SIGNIFICANT CHANGE UP
HCG SERPL-ACNC: HIGH MIU/ML
HCT VFR BLD CALC: 39.5 % — SIGNIFICANT CHANGE UP (ref 34.5–45)
HGB BLD-MCNC: 13.5 G/DL — SIGNIFICANT CHANGE UP (ref 11.5–15.5)
IMM GRANULOCYTES NFR BLD AUTO: 0.3 % — SIGNIFICANT CHANGE UP (ref 0–1.5)
KETONES UR-MCNC: ABNORMAL
LEUKOCYTE ESTERASE UR-ACNC: NEGATIVE — SIGNIFICANT CHANGE UP
LYMPHOCYTES # BLD AUTO: 2.13 K/UL — SIGNIFICANT CHANGE UP (ref 1–3.3)
LYMPHOCYTES # BLD AUTO: 28.7 % — SIGNIFICANT CHANGE UP (ref 13–44)
MCHC RBC-ENTMCNC: 29.7 PG — SIGNIFICANT CHANGE UP (ref 27–34)
MCHC RBC-ENTMCNC: 34.2 GM/DL — SIGNIFICANT CHANGE UP (ref 32–36)
MCV RBC AUTO: 87 FL — SIGNIFICANT CHANGE UP (ref 80–100)
MONOCYTES # BLD AUTO: 0.65 K/UL — SIGNIFICANT CHANGE UP (ref 0–0.9)
MONOCYTES NFR BLD AUTO: 8.7 % — SIGNIFICANT CHANGE UP (ref 2–14)
NEUTROPHILS # BLD AUTO: 4.45 K/UL — SIGNIFICANT CHANGE UP (ref 1.8–7.4)
NEUTROPHILS NFR BLD AUTO: 59.9 % — SIGNIFICANT CHANGE UP (ref 43–77)
NITRITE UR-MCNC: NEGATIVE — SIGNIFICANT CHANGE UP
PH UR: 6 — SIGNIFICANT CHANGE UP (ref 5–8)
PLATELET # BLD AUTO: 270 K/UL — SIGNIFICANT CHANGE UP (ref 150–400)
POTASSIUM SERPL-MCNC: 4.3 MMOL/L — SIGNIFICANT CHANGE UP (ref 3.5–5.3)
POTASSIUM SERPL-SCNC: 4.3 MMOL/L — SIGNIFICANT CHANGE UP (ref 3.5–5.3)
PROT SERPL-MCNC: 7.4 G/DL — SIGNIFICANT CHANGE UP (ref 6.6–8.7)
PROT UR-MCNC: NEGATIVE — SIGNIFICANT CHANGE UP
RBC # BLD: 4.54 M/UL — SIGNIFICANT CHANGE UP (ref 3.8–5.2)
RBC # FLD: 11.9 % — SIGNIFICANT CHANGE UP (ref 10.3–14.5)
RBC CASTS # UR COMP ASSIST: SIGNIFICANT CHANGE UP /HPF (ref 0–4)
SODIUM SERPL-SCNC: 136 MMOL/L — SIGNIFICANT CHANGE UP (ref 135–145)
SP GR SPEC: 1.01 — SIGNIFICANT CHANGE UP (ref 1.01–1.02)
UROBILINOGEN FLD QL: NEGATIVE MG/DL — SIGNIFICANT CHANGE UP
WBC # BLD: 7.43 K/UL — SIGNIFICANT CHANGE UP (ref 3.8–10.5)
WBC # FLD AUTO: 7.43 K/UL — SIGNIFICANT CHANGE UP (ref 3.8–10.5)
WBC UR QL: SIGNIFICANT CHANGE UP /HPF (ref 0–5)

## 2022-08-14 PROCEDURE — 86850 RBC ANTIBODY SCREEN: CPT

## 2022-08-14 PROCEDURE — 80053 COMPREHEN METABOLIC PANEL: CPT

## 2022-08-14 PROCEDURE — 76817 TRANSVAGINAL US OBSTETRIC: CPT | Mod: 26

## 2022-08-14 PROCEDURE — 85025 COMPLETE CBC W/AUTO DIFF WBC: CPT

## 2022-08-14 PROCEDURE — 36415 COLL VENOUS BLD VENIPUNCTURE: CPT

## 2022-08-14 PROCEDURE — 86900 BLOOD TYPING SEROLOGIC ABO: CPT

## 2022-08-14 PROCEDURE — 76801 OB US < 14 WKS SINGLE FETUS: CPT | Mod: 26

## 2022-08-14 PROCEDURE — 86901 BLOOD TYPING SEROLOGIC RH(D): CPT

## 2022-08-14 PROCEDURE — 81001 URINALYSIS AUTO W/SCOPE: CPT

## 2022-08-14 PROCEDURE — 76801 OB US < 14 WKS SINGLE FETUS: CPT

## 2022-08-14 PROCEDURE — 99284 EMERGENCY DEPT VISIT MOD MDM: CPT | Mod: 25

## 2022-08-14 PROCEDURE — 84702 CHORIONIC GONADOTROPIN TEST: CPT

## 2022-08-14 PROCEDURE — 99285 EMERGENCY DEPT VISIT HI MDM: CPT

## 2022-08-14 PROCEDURE — 76817 TRANSVAGINAL US OBSTETRIC: CPT

## 2022-08-14 RX ORDER — NITROFURANTOIN MACROCRYSTAL 50 MG
1 CAPSULE ORAL
Qty: 14 | Refills: 0
Start: 2022-08-14 | End: 2022-08-20

## 2022-08-14 NOTE — ED STATDOCS - PATIENT PORTAL LINK FT
You can access the FollowMyHealth Patient Portal offered by [facility] by registering at the following website: [email or web address]. By joining [employer]’s FollowMyHealth portal, you will also be able to view your health information using other applications (apps) compatible with our system.

## 2022-08-14 NOTE — ED STATDOCS - ATTENDING APP SHARED VISIT CONTRIBUTION OF CARE
I, Segundo Reeder, performed the initial face to face bedside interview with this patient regarding history of present illness, review of symptoms and relevant past medical, social and family history.  I completed an independent physical examination.  I was the initial provider who evaluated this patient. I have signed out the follow up of any pending tests (i.e. labs, radiological studies) to the ACP.  I have communicated the patient’s plan of care and disposition with the ACP.

## 2022-08-14 NOTE — ED ADULT TRIAGE NOTE - CHIEF COMPLAINT QUOTE
Patient presents to ED at 13 weeks pregnant c/o vaginal discharge since Friday. Denies chest pain, SOB. Reports intermittent cramping. KEVIN 2/13/23.

## 2022-08-14 NOTE — ED STATDOCS - OBJECTIVE STATEMENT
28 y/o female with PMHx of miscarriage presents 15 weeks pregnant. Pt sw blood while wiping today in the bathroom. First pregnancy was a miscarriage and had 1 normal pregnancy and birth. Pt states she does not have an OB appointment until September. Pt cannot tell if she is bleeding consistently or if it was just during urination.  : Antoni 28 y/o female with PMHx of miscarriage presents 15 weeks pregnant. Pt saw blood while wiping today in the bathroom. Pt's first pregnancy was a miscarriage and had 1 normal pregnancy & birth. Pt states she does not have an OB appointment until September. Pt cannot tell if she is bleeding consistently or if it was just during urination. No other complaints at this time.  : Antoni

## 2022-08-14 NOTE — ED STATDOCS - ADDITIONAL NOTES AND INSTRUCTIONS:
PT was evaluated At [facility] ED and was found to have a condition that warranted time of to rest and heal from WORK/SCHOOL.   Prasanth Hassan PA-C

## 2022-08-14 NOTE — ED STATDOCS - NS ED ATTENDING STATEMENT MOD
This was a shared visit with the GONSALO. I reviewed and verified the documentation and independently performed the documented:

## 2022-08-14 NOTE — ED STATDOCS - PROGRESS NOTE DETAILS
Prasanth Bush: Pt seen by intake physician and HPI/ROS/PE/plan reviewed Confirmed and adjusted were appropriate.    PE: GEN: Awake, alert,  NAD,  EYES: PERRL CARDIAC: Reg rate and rhythm, S1,S2, RRR  RESP: No distress noted. Lungs CTA bilaterally no wheeze, ronchi, rales. ABD: soft,  non-tender, no guarding. . NEURO: AOx3, no focal deficits   PLAN: PT with stable VS, no acute distress, non toxic appearing, tolerating PO in the ED, Pt with no acute findings, Pt educated about threatened  and need for close follow up, pelvic rest, follow up to GYN, risk of loosening pregnancy, educated about when to return to the ED if needed. PT verbalizes that he understands all instructions and results. Pt informed that ED is open and available  365 days a yr, encouraged to return to the ED if they have any change in condition, or feel the need for revaluation.    utilized to obtain History, ROS, Physical Exam, explanations of results and plan of care, as well as follow up instructions.

## 2022-08-14 NOTE — ED STATDOCS - NSFOLLOWUPINSTRUCTIONS_ED_ALL_ED_FT
Amenaza de aborto natural    LO QUE NECESITA SABER:    ¿Qué es joe amenaza de aborto natural?Joe amenaza de aborto ocurre cuando se tiene sangrado vaginal dentro de las primeras 20 semanas de embarazo. Eso indica que podría ocurrir un aborto natural. Joe amenaza de un aborto natural también se le conoce kalyani joe amenaza de pérdida del embarazo.    ¿Qué provoca un sangrado o manchado pippa el embarazo?Es posible que se desconozca la causa del sangrado o manchado. Las siguientes son las posibles causas de un sangrado vaginal pippa el embarazo:  •Pólipos, fibromas o quistes en el útero      •Relaciones sexuales      •Infección      •Dónde o cómo está sujeta la placenta al útero (matriz)      •Un problema con el feto (bebé que no ha nacido)      •Uso de drogas o alcohol      •Embarazo ectópico (el feto se desarrolla afuera del útero)      ¿Cuáles son los signos y síntomas de joe amenaza de aborto?  •Manchado o sangrado vaginal      •Dolor o cólicos en el abdomen o área lumbar      ¿Cómo se diagnostica joe amenaza de aborto?Informe a narayanan médico cuando el sangrado empezó. Es posible que usted necesite alguno de los siguientes:  •Los análisis de sangrepueden mostrar joe infección, revisar narayanan nivel hormonal de embarazo, o proporcionar información de narayanan kandi en general.      •Un examen pélvicopara revisar el tamaño del útero. En un examen ginecológico también revisan la dilatación de narayanan suki uterino.      •Joe ecografía pélvicamuestra imágenes del feto y encuentra kellen latidos cardíacos. También la ecografía muestra kellen órganos reproductores y mantiene en observación la cantidad de sangrado.      ¿Cuál es el control de joe amenaza de aborto?Los siguientes podrían ayudar a controlar los síntomas y disminuir narayanan riesgo de un aborto natural:  •No se ponga nada dentro de narayanan vagina.No tenga relaciones sexuales, no tome duchas vaginales ni use tampones. Estas acciones pueden aumentar narayanan riesgo de joe infección o de un aborto natural.      •Repose según le indicaron.No practique ningún ejercicio ni actividades vigorosas. Estas actividades pueden causar un parto prematuro o un aborto natural. Pregunte a narayanan médico cuáles actividades físicas son las apropiadas para usted.      ¿Cuándo agata buscar atención inmediata?  •Se siente débil o que se desmaya.      •Tiene dolor o cólicos en el abdomen o en la espalda que empeoran.      •Tiene sangrado vaginal que en joe hora empapa por lo menos 1 toalla sanitaria.      •Le sale un material que parece tejido o coágulos grandes.      ¿Cuándo agata llamar a mi médico?  •Tiene fiebre.      •Tiene problemas para orinar, siente ardor o la necesidad de orinar con frecuencia.      •Tiene sangrado vaginal nuevo o que empeora.      •Tiene dolor o comezón vaginal o flujo vaginal de color amarillo o cheryl o maloliente.      •Usted tiene preguntas o inquietudes acerca de narayanan condición o cuidado.      ACUERDOS SOBRE NARAYANAN CUIDADO:    Usted tiene el derecho de ayudar a planear narayanan cuidado. Aprenda todo lo que pueda sobre narayanan condición y kalyani darle tratamiento. Discuta kellen opciones de tratamiento con kellen médicos para decidir el cuidado que usted desea recibir. Usted siempre tiene el derecho de rechazar el tratamiento.

## 2022-11-01 NOTE — ED ADULT TRIAGE NOTE - PAIN: PRESENCE, MLM
VOICEMAIL  1. Caller Name: Enmanuel Zhou                       Call Back Number: [phone] (home)      2. Message: Patient wanted to confirm his and his mother's appointments. Called and confirmed with patient.    3. Patient approves office to leave a detailed voicemail/MyChart message: yes     denies pain/discomfort

## 2023-04-20 NOTE — PATIENT PROFILE ADULT. - TEACHING/LEARNING LEARNING PREFERENCES
Normal vision: sees adequately in most situations; can see medication labels, newsprint
verbal instruction/written material

## 2023-10-02 NOTE — ED ADULT TRIAGE NOTE - CADM TRG TX PRIOR TO ARRIVAL
none
Bed in lowest position, wheels locked, appropriate side rails in place/Call bell, personal items and telephone in reach/Instruct patient to call for assistance before getting out of bed or chair/Non-slip footwear when patient is out of bed/[place] to call system/Physically safe environment - no spills, clutter or unnecessary equipment/Purposeful Proactive Rounding/Room/bathroom lighting operational, light cord in reach

## 2025-01-02 NOTE — OB RN PATIENT PROFILE - FALL HARM RISK CONCLUSION
Continue PPI and Pepcid daily.  Appears to have history of esophageal dysmotility as well.  Consult speech for swallow eval   Universal Safety Interventions

## 2025-01-30 NOTE — ED PROVIDER NOTE - MUSCULOSKELETAL NEGATIVE STATEMENT, MLM
Vitals capture stopped. no back pain, no gout, no musculoskeletal pain, no neck pain, and no weakness.

## 2025-02-11 NOTE — OB RN TRIAGE NOTE - NSLDARRIVAL_OBGYN_ALL_OB_END_DATE
Orders:    CBC and differential; Future    Comprehensive metabolic panel; Future    Lipid panel; Future     02-Mar-2020 02:30
